# Patient Record
Sex: FEMALE | Employment: OTHER | URBAN - METROPOLITAN AREA
[De-identification: names, ages, dates, MRNs, and addresses within clinical notes are randomized per-mention and may not be internally consistent; named-entity substitution may affect disease eponyms.]

---

## 2018-06-26 DIAGNOSIS — R09.89 ABNORMAL CHEST SOUNDS: Primary | ICD-10-CM

## 2018-06-26 DIAGNOSIS — G31.84 MILD COGNITIVE IMPAIRMENT WITH MEMORY LOSS: Primary | ICD-10-CM

## 2018-06-29 ENCOUNTER — HOSPITAL ENCOUNTER (OUTPATIENT)
Dept: RADIOLOGY | Facility: HOSPITAL | Age: 83
Discharge: HOME | End: 2018-06-29
Attending: FAMILY MEDICINE
Payer: MEDICARE

## 2018-06-29 ENCOUNTER — HOSPITAL ENCOUNTER (INPATIENT)
Facility: HOSPITAL | Age: 83
LOS: 2 days | Discharge: HOME HEALTH | DRG: 055 | End: 2018-07-01
Attending: EMERGENCY MEDICINE | Admitting: HOSPITALIST
Payer: MEDICARE

## 2018-06-29 DIAGNOSIS — I62.9 INTRACRANIAL HEMORRHAGE: ICD-10-CM

## 2018-06-29 DIAGNOSIS — G31.84 MILD COGNITIVE IMPAIRMENT WITH MEMORY LOSS: ICD-10-CM

## 2018-06-29 DIAGNOSIS — R09.89 ABNORMAL CHEST SOUNDS: ICD-10-CM

## 2018-06-29 DIAGNOSIS — I10 ESSENTIAL HYPERTENSION: ICD-10-CM

## 2018-06-29 DIAGNOSIS — C79.31 BRAIN METASTASIS: ICD-10-CM

## 2018-06-29 DIAGNOSIS — G93.9 BRAIN LESION: Primary | ICD-10-CM

## 2018-06-29 DIAGNOSIS — H53.461 HEMIANOPIA, HOMONYMOUS, RIGHT: ICD-10-CM

## 2018-06-29 PROBLEM — R47.01 APHASIA: Status: ACTIVE | Noted: 2018-06-29

## 2018-06-29 LAB
AFP SERPL-MCNC: 4.9 NG/ML
ALBUMIN SERPL BCP-MCNC: 3.6 G/DL
ALP SERPL-CCNC: 96 U/L
ALT SERPL W/O P-5'-P-CCNC: 11 U/L
AMORPH CRY UR QL COMP ASSIST: ABNORMAL
ANION GAP SERPL CALC-SCNC: 8 MMOL/L
AST SERPL-CCNC: 15 U/L
BACTERIA #/AREA URNS AUTO: ABNORMAL /HPF
BASOPHILS # BLD AUTO: 0.03 K/UL
BASOPHILS NFR BLD: 0.3 %
BILIRUB SERPL-MCNC: 0.5 MG/DL
BILIRUB UR QL STRIP: NEGATIVE
BUN SERPL-MCNC: 13 MG/DL
CALCIUM SERPL-MCNC: 10.5 MG/DL
CANCER AG19-9 SERPL-ACNC: 5 U/ML
CEA SERPL-MCNC: 4.4 NG/ML
CHLORIDE SERPL-SCNC: 107 MMOL/L
CLARITY UR REFRACT.AUTO: ABNORMAL
CO2 SERPL-SCNC: 28 MMOL/L
COLOR UR AUTO: YELLOW
CREAT SERPL-MCNC: 0.7 MG/DL
DIFFERENTIAL METHOD: NORMAL
EOSINOPHIL # BLD AUTO: 0.1 K/UL
EOSINOPHIL NFR BLD: 1.5 %
ERYTHROCYTE [DISTWIDTH] IN BLOOD BY AUTOMATED COUNT: 14.1 %
EST. GFR  (AFRICAN AMERICAN): >60 ML/MIN/1.73 M^2
EST. GFR  (NON AFRICAN AMERICAN): >60 ML/MIN/1.73 M^2
GLUCOSE SERPL-MCNC: 165 MG/DL
GLUCOSE UR QL STRIP: NEGATIVE
HCT VFR BLD AUTO: 42.9 %
HGB BLD-MCNC: 14.2 G/DL
HGB UR QL STRIP: NEGATIVE
IMM GRANULOCYTES # BLD AUTO: 0.03 K/UL
IMM GRANULOCYTES NFR BLD AUTO: 0.3 %
INR PPP: 0.9
KETONES UR QL STRIP: NEGATIVE
LEUKOCYTE ESTERASE UR QL STRIP: ABNORMAL
LYMPHOCYTES # BLD AUTO: 2.3 K/UL
LYMPHOCYTES NFR BLD: 26.1 %
MAGNESIUM SERPL-MCNC: 2 MG/DL
MCH RBC QN AUTO: 29.9 PG
MCHC RBC AUTO-ENTMCNC: 33.1 G/DL
MCV RBC AUTO: 90 FL
MICROSCOPIC COMMENT: ABNORMAL
MONOCYTES # BLD AUTO: 0.6 K/UL
MONOCYTES NFR BLD: 6.8 %
NEUTROPHILS # BLD AUTO: 5.6 K/UL
NEUTROPHILS NFR BLD: 65 %
NITRITE UR QL STRIP: POSITIVE
NRBC BLD-RTO: 0 /100 WBC
PH UR STRIP: 7 [PH] (ref 5–8)
PHOSPHATE SERPL-MCNC: 2.9 MG/DL
PLATELET # BLD AUTO: 220 K/UL
PMV BLD AUTO: 11.2 FL
POTASSIUM SERPL-SCNC: 3.7 MMOL/L
PROT SERPL-MCNC: 7.1 G/DL
PROT UR QL STRIP: NEGATIVE
PROTHROMBIN TIME: 10.1 SEC
RBC # BLD AUTO: 4.75 M/UL
SODIUM SERPL-SCNC: 143 MMOL/L
SP GR UR STRIP: 1.02 (ref 1–1.03)
URN SPEC COLLECT METH UR: ABNORMAL
UROBILINOGEN UR STRIP-ACNC: NEGATIVE EU/DL
WBC # BLD AUTO: 8.62 K/UL
WBC #/AREA URNS AUTO: 6 /HPF (ref 0–5)
WBC CLUMPS UR QL AUTO: ABNORMAL

## 2018-06-29 PROCEDURE — 25000003 PHARM REV CODE 250: Performed by: HOSPITALIST

## 2018-06-29 PROCEDURE — 99285 EMERGENCY DEPT VISIT HI MDM: CPT | Mod: ,,, | Performed by: EMERGENCY MEDICINE

## 2018-06-29 PROCEDURE — 93880 EXTRACRANIAL BILAT STUDY: CPT | Mod: TC

## 2018-06-29 PROCEDURE — 70450 CT HEAD/BRAIN W/O DYE: CPT | Mod: 26,,, | Performed by: RADIOLOGY

## 2018-06-29 PROCEDURE — 70450 CT HEAD/BRAIN W/O DYE: CPT | Mod: TC

## 2018-06-29 PROCEDURE — 93880 EXTRACRANIAL BILAT STUDY: CPT | Mod: 26,,, | Performed by: RADIOLOGY

## 2018-06-29 PROCEDURE — 85610 PROTHROMBIN TIME: CPT

## 2018-06-29 PROCEDURE — 99223 1ST HOSP IP/OBS HIGH 75: CPT | Mod: ,,, | Performed by: HOSPITALIST

## 2018-06-29 PROCEDURE — 25500020 PHARM REV CODE 255: Performed by: HOSPITALIST

## 2018-06-29 PROCEDURE — 11000001 HC ACUTE MED/SURG PRIVATE ROOM

## 2018-06-29 PROCEDURE — 85025 COMPLETE CBC W/AUTO DIFF WBC: CPT

## 2018-06-29 PROCEDURE — 25500020 PHARM REV CODE 255: Performed by: EMERGENCY MEDICINE

## 2018-06-29 PROCEDURE — 86301 IMMUNOASSAY TUMOR CA 19-9: CPT

## 2018-06-29 PROCEDURE — 83735 ASSAY OF MAGNESIUM: CPT

## 2018-06-29 PROCEDURE — A9585 GADOBUTROL INJECTION: HCPCS | Performed by: EMERGENCY MEDICINE

## 2018-06-29 PROCEDURE — 99285 EMERGENCY DEPT VISIT HI MDM: CPT

## 2018-06-29 PROCEDURE — 80053 COMPREHEN METABOLIC PANEL: CPT

## 2018-06-29 PROCEDURE — 81001 URINALYSIS AUTO W/SCOPE: CPT

## 2018-06-29 PROCEDURE — 82105 ALPHA-FETOPROTEIN SERUM: CPT

## 2018-06-29 PROCEDURE — 99223 1ST HOSP IP/OBS HIGH 75: CPT | Mod: ,,, | Performed by: NURSE PRACTITIONER

## 2018-06-29 PROCEDURE — 84100 ASSAY OF PHOSPHORUS: CPT

## 2018-06-29 PROCEDURE — 93010 ELECTROCARDIOGRAM REPORT: CPT | Mod: ,,, | Performed by: INTERNAL MEDICINE

## 2018-06-29 PROCEDURE — 82378 CARCINOEMBRYONIC ANTIGEN: CPT

## 2018-06-29 RX ORDER — LISINOPRIL 20 MG/1
20 TABLET ORAL DAILY
Status: DISCONTINUED | OUTPATIENT
Start: 2018-06-30 | End: 2018-07-01 | Stop reason: HOSPADM

## 2018-06-29 RX ORDER — AMLODIPINE BESYLATE 5 MG/1
5 TABLET ORAL DAILY
Status: DISCONTINUED | OUTPATIENT
Start: 2018-06-30 | End: 2018-07-01 | Stop reason: HOSPADM

## 2018-06-29 RX ORDER — ALPRAZOLAM 0.25 MG/1
0.25 TABLET ORAL 3 TIMES DAILY
COMMUNITY

## 2018-06-29 RX ORDER — GADOBUTROL 604.72 MG/ML
7 INJECTION INTRAVENOUS
Status: COMPLETED | OUTPATIENT
Start: 2018-06-29 | End: 2018-06-29

## 2018-06-29 RX ORDER — ALPRAZOLAM 0.25 MG/1
0.25 TABLET ORAL 3 TIMES DAILY
Status: DISCONTINUED | OUTPATIENT
Start: 2018-06-30 | End: 2018-06-29

## 2018-06-29 RX ORDER — ZOLPIDEM TARTRATE 10 MG/1
5 TABLET ORAL NIGHTLY PRN
COMMUNITY

## 2018-06-29 RX ORDER — FLUTICASONE PROPIONATE 50 MCG
2 SPRAY, SUSPENSION (ML) NASAL DAILY
Status: DISCONTINUED | OUTPATIENT
Start: 2018-06-29 | End: 2018-07-01 | Stop reason: HOSPADM

## 2018-06-29 RX ORDER — AMLODIPINE BESYLATE 5 MG/1
5 TABLET ORAL DAILY
COMMUNITY

## 2018-06-29 RX ORDER — CETIRIZINE HYDROCHLORIDE 5 MG/1
5 TABLET ORAL DAILY
Status: DISCONTINUED | OUTPATIENT
Start: 2018-06-29 | End: 2018-07-01 | Stop reason: HOSPADM

## 2018-06-29 RX ORDER — ATORVASTATIN CALCIUM 40 MG/1
40 TABLET, FILM COATED ORAL DAILY
COMMUNITY

## 2018-06-29 RX ORDER — LISINOPRIL 20 MG/1
20 TABLET ORAL DAILY
COMMUNITY

## 2018-06-29 RX ORDER — ALPRAZOLAM 0.25 MG/1
0.25 TABLET ORAL 3 TIMES DAILY PRN
Status: DISCONTINUED | OUTPATIENT
Start: 2018-06-29 | End: 2018-07-01 | Stop reason: HOSPADM

## 2018-06-29 RX ADMIN — IOHEXOL 75 ML: 350 INJECTION, SOLUTION INTRAVENOUS at 11:06

## 2018-06-29 RX ADMIN — GADOBUTROL 7 ML: 604.72 INJECTION INTRAVENOUS at 05:06

## 2018-06-29 RX ADMIN — CETIRIZINE HYDROCHLORIDE 5 MG: 5 TABLET, FILM COATED ORAL at 09:06

## 2018-06-29 NOTE — ED TRIAGE NOTES
Pt had fell a month ago, has had slurred and scattered speech since.  Went for a CT today,  MD sent them here concerned about a stroke. No diagnosis of alzheimers or dementia.  Patient stating her age incorrectly, not able to name president, is able to state the month.  No neurological deficits noted. Pupils round and reactive.
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
## 2018-06-29 NOTE — ED PROVIDER NOTES
"Encounter Date: 6/29/2018    SCRIBE #1 NOTE: I, Young Salazar, am scribing for, and in the presence of,  Dr. Sparks. I have scribed the entire note.       History     Chief Complaint   Patient presents with    Abnormal Ct Scan     Pt called from home by MD to come to ED for eval of abnormal CT scan.  Pt fell 1 month ago with increased symptoms over the last 2 weeks of dysphasia and slow ambulation     81 y/o female with co-morbidity of HTN presents to the ED after CT of her head came abnormal. She had a fall a month ago. Per son, she has worsening "difficulty finding words"  and slower ambulation over the last two weeks. Pt denies headaches or difficulty swallowing. No other complaints.      The history is provided by the patient, medical records and a relative.     Review of patient's allergies indicates:  No Known Allergies  Past Medical History:   Diagnosis Date    Hypertension      History reviewed. No pertinent surgical history.  History reviewed. No pertinent family history.  Social History   Substance Use Topics    Smoking status: Current Every Day Smoker     Packs/day: 0.50     Types: Cigarettes    Smokeless tobacco: Never Used    Alcohol use No     Review of Systems   Constitutional: Negative for fever.   HENT: Negative for sore throat and trouble swallowing.    Respiratory: Negative for shortness of breath.    Cardiovascular: Negative for chest pain.   Gastrointestinal: Negative for nausea.   Genitourinary: Negative for dysuria.   Musculoskeletal: Negative for back pain.   Skin: Negative for rash.   Neurological: Negative for weakness and headaches.   Hematological: Does not bruise/bleed easily.       Physical Exam     Initial Vitals [06/29/18 1520]   BP Pulse Resp Temp SpO2   (!) 195/86 68 20 98.4 °F (36.9 °C) 98 %      MAP       --         Physical Exam    Vitals reviewed.  Constitutional: She appears well-developed and well-nourished.   HENT:   Head: Normocephalic and atraumatic.   Mouth/Throat: " Oropharynx is clear and moist.   Eyes: EOM are normal. Pupils are equal, round, and reactive to light.   Neck: No tracheal deviation present.   Cardiovascular: Normal rate and intact distal pulses.   Pulmonary/Chest: Breath sounds normal. No stridor. No respiratory distress.   Abdominal: Soft. She exhibits no distension. There is no tenderness.   Musculoskeletal: Normal range of motion. She exhibits no edema.   Neurological: She is alert and oriented to person, place, and time.   Skin: Skin is warm.   Psychiatric: Her behavior is normal. Thought content normal.         ED Course   Procedures  Labs Reviewed   COMPREHENSIVE METABOLIC PANEL - Abnormal; Notable for the following:        Result Value    Glucose 165 (*)     All other components within normal limits   URINALYSIS, REFLEX TO URINE CULTURE - Abnormal; Notable for the following:     Appearance, UA Cloudy (*)     Nitrite, UA Positive (*)     Leukocytes, UA 2+ (*)     All other components within normal limits    Narrative:     Preferred Collection Type->Urine, Clean Catch   URINALYSIS MICROSCOPIC - Abnormal; Notable for the following:     WBC, UA 6 (*)     Bacteria, UA Moderate (*)     All other components within normal limits    Narrative:     Preferred Collection Type->Urine, Clean Catch   CBC W/ AUTO DIFFERENTIAL   PROTIME-INR   MAGNESIUM   PHOSPHORUS   CEA   AFP TUMOR MARKER   CANCER ANTIGEN 19-9          Imaging Results          CT Chest Abdoment Pelvis With Contrast (Final result)  Result time 06/30/18 00:23:51   Procedure changed from CT Chest Abdomen Pelvis W W/O Contrast (XPD)     Final result by French Winchester MD (06/30/18 00:23:51)                 Impression:      1.4 cm exophytic rounded right lower pole renal lesion concerning for an underlying renal mass.  Further evaluation with MRI or CT renal mass protocol recommended.    Bilateral indeterminate adrenal gland nodules concerning for adrenal metastases versus adenomas.  Further evaluation with  MRI or CT adrenal mass protocol.    Ectatic descending thoracic aorta.    Cholelithiasis.    Colonic diverticulosis.    Additional findings as detailed above.    Electronically signed by resident: Gómez Guy  Date:    06/29/2018  Time:    23:37    Electronically signed by: French Winchester MD  Date:    06/30/2018  Time:    00:23             Narrative:    EXAMINATION:  CT CHEST ABDOMEN PELVIS WITH CONTRAST (XPD)    CLINICAL HISTORY:  Brain mets - looking for primary;    TECHNIQUE:  Axial CT images of the chest abdomen and pelvis obtained after the administration of intravenous contrast 75 cc of Omnipaque 350.  Coronal and sagittal reformats are provided.  Delayed phase imaging was also obtained.    COMPARISON:  None    FINDINGS:  Examination of the vascular and soft tissue structures at the base of the neck is normal.    A left sided aortic arch with 3 branch vessels is identified.  The thoracic aorta is normal in contour and course with moderate atherosclerotic calcification within its course.  The descending thoracic aorta is ectatic measuring up to 2.8 cm at the diaphragmatic hiatus.  The heart is not enlarged. No pericardial effusion is seen.  There is coronary artery atherosclerosis.    The trachea is midline, the proximal airways are patent, and the lungs are symmetrically expanded.  Examination of the lung fields demonstrates no evidence of consolidation, mass, or significant pleural thickening, nor is there evidence of pleural fluid.    No axillary or mediastinal lymph node enlargement is seen.    The esophagus maintains a normal course and caliber.    The liver is normal in size and attenuation.  No focal hepatic abnormality is seen.  The gallbladder demonstrate a 1.3 cm gallstone.  No surrounding inflammatory changes.  No intrahepatic or extrahepatic biliary ductal dilatation is noted.    The stomach, spleen, and pancreas are normal.  There is a 2.1 x 1.6 left adrenal gland nodule.  There is a 1.8 x 1.7  cm right adrenal gland nodule.    The kidneys are normal in size and location.  There is a 1.3 x 1.4 cm enhancing exophytic lesion along the lower pole of the right kidney.  No hydronephrosis is seen.  The ureters appear normal in course and caliber without evidence of ureteral dilatation. The urinary bladder is normal. The uterus is surgically removed.    The visualized loops of small and large bowel show no evidence of obstruction or inflammation.  There is colonic diverticulosis without evidence of diverticulitis.  Appendix is normal.    No ascites, free fluid, or intraperitoneal free air is identified.    There is no evidence of lymph node enlargement in the abdomen or pelvis.    The abdominal aorta is tortuous with significant atherosclerotic calcifications throughout its course and branching vessels.    The osseus structures demonstrate age-appropriate degenerative change without evidence of acute fracture or osseus destructive process.    The extraperitoneal soft tissues are normal.                               MRI Brain W WO Contrast (Final result)  Result time 06/29/18 18:58:28    Final result by Adeel Guaman MD (06/29/18 18:58:28)                 Impression:      Multiple rim enhancing masses within the supratentorial and tentorial brain with the largest lesion in the left occipital lobe measuring 2.2 x 2.3 x 2.3 cm.  The findings are most suggestive of metastatic disease to the brain.    Susceptibility weighted artifact involving the lesion in the right CP angle may represent prior blood products.  No definite evidence of acute parenchymal hemorrhage.    Scattered areas of diffusion the bilateral CP angles are felt to represent cellularity of the associated lesions without definitive evidence of infarction.    Electronically signed by resident: Jim Valle  Date:    06/29/2018  Time:    18:01    Electronically signed by: Adeel Guaman MD  Date:    06/29/2018  Time:    18:58             Narrative:     EXAMINATION:  MRI BRAIN W WO CONTRAST    CLINICAL HISTORY:  intracranial hemorrhage;.    TECHNIQUE:  Multiplanar and multisequence MR imaging of the brain was performed before and after the administration of 7 mL Gadavist  intravenous contrast.    COMPARISON:  Head CT without contrast 06/29/2018    FINDINGS:  Intracranial compartment:    The ventricles and sulci are normal in size for age without evidence of hydrocephalus. There is SWI signal dropout in the right CP angle cistern.    There are multiple enhancing masses within the brain.  The largest is in the left occipital lobe and measures 2.3 x 2.3 x 2.3 cm with peripheral edema on T2/FLAIR imaging.  This lesion appears intrinsically T2 bright and T1 hypo intense.  There are multiple smaller lesions exhibiting enhancement in the left temporal lobe left frontal lobe and bilateral cerebellopontine angles.  The smaller lesions are less clearly rim enhancing aside from the right cerebellopontine angle lesion.  There is also a 1 cm enhancing lesion along the inferior interhemispheric fissure.  No evidence of associated edema is identified.  There is FLAIR hyperintensity about the left frontal and CP angle lesions suggesting edema.  There are scattered areas of diffusion restriction the bilateral CP angles.    Additional T2/FLAIR hyperintensities consistent with mild chronic microvascular ischemic change.  No midline shift or significant global mass effect from these masses.    The intracranial flow voids are within normal limits.    Skull/extracranial contents (limited evaluation): Bone marrow signal intensity is normal.                                 Medical Decision Making:   Differential Diagnosis:   Intracranial hemorrhage, brain mass, electrolyte derangement, lethal arrhythmia            Scribe Attestation:   Scribe #1: I performed the above scribed service and the documentation accurately describes the services I performed. I attest to the accuracy of the  note.    Attending Attestation:             Attending ED Notes:   Patient has been urgently evaluated by vascular neurology who have reviewed advanced imaging of the brain and do not feel that the patient's neurologic presenting complaints are due to an acute/subacute stroke, rather related to a metastatic brain lesion identified on MRI.  The patient will be admitted to Internal Medicine in fair condition for further management and evaluation.             Clinical Impression:   The primary encounter diagnosis was Brain lesion. Diagnoses of Intracranial hemorrhage, Brain metastasis, Hemianopia, homonymous, right, and Essential hypertension were also pertinent to this visit.      Disposition:   Disposition: Admitted  Condition: Fair  IM                        Frandy Sparks MD  07/02/18 0149

## 2018-06-30 PROBLEM — E27.8 ADRENAL NODULE: Status: ACTIVE | Noted: 2018-06-30

## 2018-06-30 PROBLEM — F51.01 PRIMARY INSOMNIA: Chronic | Status: ACTIVE | Noted: 2018-06-30

## 2018-06-30 PROBLEM — N28.89 RENAL MASS, RIGHT: Status: ACTIVE | Noted: 2018-06-30

## 2018-06-30 PROBLEM — N30.00 ACUTE CYSTITIS WITHOUT HEMATURIA: Status: ACTIVE | Noted: 2018-06-30

## 2018-06-30 PROCEDURE — 25000003 PHARM REV CODE 250: Performed by: PHYSICIAN ASSISTANT

## 2018-06-30 PROCEDURE — 99223 1ST HOSP IP/OBS HIGH 75: CPT | Mod: ,,, | Performed by: NEUROLOGICAL SURGERY

## 2018-06-30 PROCEDURE — 25000003 PHARM REV CODE 250: Performed by: HOSPITALIST

## 2018-06-30 PROCEDURE — A9585 GADOBUTROL INJECTION: HCPCS | Performed by: HOSPITALIST

## 2018-06-30 PROCEDURE — 97165 OT EVAL LOW COMPLEX 30 MIN: CPT

## 2018-06-30 PROCEDURE — 11000001 HC ACUTE MED/SURG PRIVATE ROOM

## 2018-06-30 PROCEDURE — 25000003 PHARM REV CODE 250: Performed by: NURSE PRACTITIONER

## 2018-06-30 PROCEDURE — 63600175 PHARM REV CODE 636 W HCPCS: Performed by: HOSPITALIST

## 2018-06-30 PROCEDURE — 97162 PT EVAL MOD COMPLEX 30 MIN: CPT

## 2018-06-30 PROCEDURE — 99232 SBSQ HOSP IP/OBS MODERATE 35: CPT | Mod: ,,, | Performed by: HOSPITALIST

## 2018-06-30 PROCEDURE — 25500020 PHARM REV CODE 255: Performed by: HOSPITALIST

## 2018-06-30 PROCEDURE — 97161 PT EVAL LOW COMPLEX 20 MIN: CPT

## 2018-06-30 PROCEDURE — 97530 THERAPEUTIC ACTIVITIES: CPT

## 2018-06-30 PROCEDURE — 92610 EVALUATE SWALLOWING FUNCTION: CPT

## 2018-06-30 RX ORDER — CEFTRIAXONE 1 G/1
1 INJECTION, POWDER, FOR SOLUTION INTRAMUSCULAR; INTRAVENOUS
Status: DISCONTINUED | OUTPATIENT
Start: 2018-06-30 | End: 2018-07-01 | Stop reason: HOSPADM

## 2018-06-30 RX ORDER — PROMETHAZINE HYDROCHLORIDE 25 MG/1
25 TABLET ORAL EVERY 6 HOURS PRN
Status: DISCONTINUED | OUTPATIENT
Start: 2018-06-30 | End: 2018-07-01 | Stop reason: HOSPADM

## 2018-06-30 RX ORDER — ZOLPIDEM TARTRATE 5 MG/1
5 TABLET ORAL NIGHTLY PRN
Status: DISCONTINUED | OUTPATIENT
Start: 2018-06-30 | End: 2018-06-30

## 2018-06-30 RX ORDER — POLYETHYLENE GLYCOL 3350 17 G/17G
17 POWDER, FOR SOLUTION ORAL 2 TIMES DAILY PRN
Status: DISCONTINUED | OUTPATIENT
Start: 2018-06-30 | End: 2018-07-01 | Stop reason: HOSPADM

## 2018-06-30 RX ORDER — SODIUM CHLORIDE 0.9 % (FLUSH) 0.9 %
5 SYRINGE (ML) INJECTION
Status: DISCONTINUED | OUTPATIENT
Start: 2018-06-30 | End: 2018-07-01 | Stop reason: HOSPADM

## 2018-06-30 RX ORDER — AMOXICILLIN 250 MG
1 CAPSULE ORAL DAILY
Status: DISCONTINUED | OUTPATIENT
Start: 2018-06-30 | End: 2018-07-01 | Stop reason: HOSPADM

## 2018-06-30 RX ORDER — GADOBUTROL 604.72 MG/ML
10 INJECTION INTRAVENOUS
Status: COMPLETED | OUTPATIENT
Start: 2018-06-30 | End: 2018-06-30

## 2018-06-30 RX ORDER — RAMELTEON 8 MG/1
8 TABLET ORAL NIGHTLY PRN
Status: DISCONTINUED | OUTPATIENT
Start: 2018-06-30 | End: 2018-06-30

## 2018-06-30 RX ORDER — ACETAMINOPHEN 325 MG/1
650 TABLET ORAL EVERY 8 HOURS PRN
Status: DISCONTINUED | OUTPATIENT
Start: 2018-06-30 | End: 2018-07-01 | Stop reason: HOSPADM

## 2018-06-30 RX ORDER — ZOLPIDEM TARTRATE 5 MG/1
5 TABLET ORAL NIGHTLY
Status: DISCONTINUED | OUTPATIENT
Start: 2018-06-30 | End: 2018-07-01 | Stop reason: HOSPADM

## 2018-06-30 RX ORDER — DEXAMETHASONE 6 MG/1
6 TABLET ORAL 2 TIMES DAILY WITH MEALS
Qty: 20 TABLET | Refills: 0
Start: 2018-06-30 | End: 2018-07-01

## 2018-06-30 RX ORDER — LEVETIRACETAM 500 MG/1
500 TABLET ORAL 2 TIMES DAILY
Status: DISCONTINUED | OUTPATIENT
Start: 2018-06-30 | End: 2018-07-01 | Stop reason: HOSPADM

## 2018-06-30 RX ORDER — ONDANSETRON 8 MG/1
8 TABLET, ORALLY DISINTEGRATING ORAL EVERY 8 HOURS PRN
Status: DISCONTINUED | OUTPATIENT
Start: 2018-06-30 | End: 2018-07-01 | Stop reason: HOSPADM

## 2018-06-30 RX ORDER — LEVETIRACETAM 500 MG/1
500 TABLET ORAL 2 TIMES DAILY
Qty: 60 TABLET | Refills: 11
Start: 2018-06-30 | End: 2018-07-01

## 2018-06-30 RX ORDER — LEVOFLOXACIN 750 MG/1
750 TABLET ORAL DAILY
Qty: 4 TABLET | Refills: 0
Start: 2018-06-30 | End: 2018-07-01

## 2018-06-30 RX ORDER — DEXAMETHASONE 4 MG/1
4 TABLET ORAL EVERY 6 HOURS
Status: DISCONTINUED | OUTPATIENT
Start: 2018-06-30 | End: 2018-07-01 | Stop reason: HOSPADM

## 2018-06-30 RX ADMIN — AMLODIPINE BESYLATE 5 MG: 5 TABLET ORAL at 08:06

## 2018-06-30 RX ADMIN — DEXAMETHASONE 4 MG: 4 TABLET ORAL at 06:06

## 2018-06-30 RX ADMIN — CEFTRIAXONE SODIUM 1 G: 1 INJECTION, POWDER, FOR SOLUTION INTRAMUSCULAR; INTRAVENOUS at 03:06

## 2018-06-30 RX ADMIN — LEVETIRACETAM 500 MG: 500 TABLET, FILM COATED ORAL at 08:06

## 2018-06-30 RX ADMIN — CETIRIZINE HYDROCHLORIDE 5 MG: 5 TABLET, FILM COATED ORAL at 08:06

## 2018-06-30 RX ADMIN — ZOLPIDEM TARTRATE 5 MG: 5 TABLET ORAL at 09:06

## 2018-06-30 RX ADMIN — GADOBUTROL 10 ML: 604.72 INJECTION INTRAVENOUS at 05:06

## 2018-06-30 RX ADMIN — RAMELTEON 8 MG: 8 TABLET, FILM COATED ORAL at 12:06

## 2018-06-30 RX ADMIN — DEXAMETHASONE 4 MG: 4 TABLET ORAL at 11:06

## 2018-06-30 RX ADMIN — LISINOPRIL 20 MG: 20 TABLET ORAL at 08:06

## 2018-06-30 RX ADMIN — LEVETIRACETAM 500 MG: 500 TABLET, FILM COATED ORAL at 11:06

## 2018-06-30 NOTE — ASSESSMENT & PLAN NOTE
Chronic issue, Goal BP <160  - Might benefit from better BP control outpatient  - Amlodipine and lisinopril

## 2018-06-30 NOTE — SUBJECTIVE & OBJECTIVE
Interval History:  Doing well, no complains. Updated pt and family on findings so far.     Review of Systems   Constitutional: Negative for chills, fatigue, fever.   HENT: Negative for sore throat, trouble swallowing.    Eyes: Negative for photophobia, visual disturbance.   Respiratory: Negative for cough, shortness of breath.    Cardiovascular: Negative for chest pain, palpitations, leg swelling.   Gastrointestinal: Negative for abdominal pain, constipation, diarrhea, nausea, vomiting.   Endocrine: Negative for cold intolerance, heat intolerance.   Genitourinary: Negative for dysuria, frequency.   Musculoskeletal: Negative for arthralgias, myalgias.   Skin: Negative for rash, wound, erythema   Neurological: + weakness   Psychiatric/Behavioral: + speech difficulty  All other systems reviewed and are negative.     Objective:     Vital Signs (Most Recent):  Temp: 97.9 °F (36.6 °C) (06/30/18 0621)  Pulse: 65 (06/30/18 0731)  Resp: 15 (06/30/18 0731)  BP: (!) 154/70 (06/30/18 0731)  SpO2: (!) 94 % (06/30/18 0731) Vital Signs (24h Range):  Temp:  [97.9 °F (36.6 °C)-98.4 °F (36.9 °C)] 97.9 °F (36.6 °C)  Pulse:  [] 65  Resp:  [15-41] 15  SpO2:  [91 %-98 %] 94 %  BP: (147-195)/(70-90) 154/70     Weight: 60.7 kg (133 lb 13.1 oz)  Body mass index is 21.6 kg/m².  No intake or output data in the 24 hours ending 06/30/18 1350     Physical Exam  Constitutional: Appears well-developed and well-nourished.   Head: Normocephalic and atraumatic.   Mouth/Throat: Oropharynx is clear and moist.   Eyes: EOM are normal. Pupils are equal, round, and reactive to light. No scleral icterus.   Neck: Normal range of motion. Neck supple.   Cardiovascular: Normal heart rate.  Regular heart rhythm.  No murmur heard.  Pulmonary/Chest: Effort normal and breath sounds normal. No respiratory distress. No wheezes, rales, or rhonchi  Abdominal: Soft. Bowel sounds are normal.  No distension.  No tenderness  Musculoskeletal: Normal range of motion.  No edema.   Neurological: Alert and oriented to person, place, and time.   Skin: Skin is warm and dry.   Psychiatric: Normal mood and affect. Behavior is normal.     MELD-Na score: 6 at 6/29/2018  3:59 PM  MELD score: 6 at 6/29/2018  3:59 PM  Calculated from:  Serum Creatinine: 0.7 mg/dL (Rounded to 1) at 6/29/2018  3:59 PM  Serum Sodium: 143 mmol/L (Rounded to 137) at 6/29/2018  3:59 PM  Total Bilirubin: 0.5 mg/dL (Rounded to 1) at 6/29/2018  3:59 PM  INR(ratio): 0.9 (Rounded to 1) at 6/29/2018  3:59 PM  Age: 82 years    Significant Labs:  CBC:    Recent Labs  Lab 06/29/18  1559   WBC 8.62   HGB 14.2   HCT 42.9        CMP:    Recent Labs  Lab 06/29/18  1559      K 3.7      CO2 28   *   BUN 13   CREATININE 0.7   CALCIUM 10.5   PROT 7.1   ALBUMIN 3.6   BILITOT 0.5   ALKPHOS 96   AST 15   ALT 11   ANIONGAP 8   EGFRNONAA >60.0     PTINR:    Recent Labs  Lab 06/29/18  1559   INR 0.9       Significant Procedures:   Dobutamine Stress Test with Color Flow: No results found for this or any previous visit.    None

## 2018-06-30 NOTE — ASSESSMENT & PLAN NOTE
Right Homonymous Hemianopsia  - CT head remarkable for an acute left occipital and left parieto-occipital infarct with hemorrhagic transformation  - Evaluated by Vascular Neurology - suggest PT/OT/SLP eval  - pt was to go home, will set up home health

## 2018-06-30 NOTE — ASSESSMENT & PLAN NOTE
- MRI Brain (6/29/18) showed multiple rim enhancing masses with the largeest measuring 2.2 x 2.3 x 2.3cm  - CT C/A/P suspicious for renal and adrenal lesion but subsequent MRI  revealed benign etiology (adrenal lesions suggestive of adrenal adenoma and renal lesion consistent with hemorrhagic cyst)  - CEA, AFP, CA 19-9: are negative  - NSGY consulted, PO steroid started, PO Keppra started  - Patient says she doesn't want invasive measures at this time - patient does not want to pursue brain biopsy  - Oncology consulted, appreciate eval and discussion with family  - Adrenal masses too small for biopsy, pt does not want brain biopsy given its invasive nature  - Oncology recommend radiation oncology evaluation for consideration of XRT, follow up with hematology/oncology clinic (would like to be seen in [facility]) and PET scan to look for other areas to biopsy   - PT/OT consulted: will need home health, passed SLP  - D/C home per pt's wish. Family is comfortable with this plan

## 2018-06-30 NOTE — ASSESSMENT & PLAN NOTE
- given new neuro complains, UA was checked  - + nitrite   - start IV rocephin, plan to convert to PO soon
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
## 2018-06-30 NOTE — PLAN OF CARE
[facility]    HOME HEALTH ORDERS  FACE TO FACE ENCOUNTER    Patient Name: Alfreda Botello  YOB: 1935    PCP: Louis Estrada Iii, MD   [address]  PCP Phone Number: [phone]  PCP Fax: [phone]    Encounter Date: 07/01/2018    Admit to Home Health    Diagnoses:  Active Hospital Problems    Diagnosis  POA    *Brain metastasis [C79.31]  Yes    Acute cystitis without hematuria [N30.00]  Yes    Primary insomnia [F51.01]  Yes     Chronic    Renal mass, right [N28.89]  Yes    Adrenal nodule [E27.9]  Yes    Hemianopia, homonymous, right [H53.461]  Yes    Aphasia [R47.01]  Yes    Essential hypertension [I10]  Yes    Brain lesion [G93.9]  Yes      Resolved Hospital Problems    Diagnosis Date Resolved POA   No resolved problems to display.       No future appointments.  Follow-up Information     Louis Estrada Iii, MD. Schedule an appointment as soon as possible for a visit in 1 month.    Specialty:  Family Medicine  Why:  Office visit, Post Hospital Follow Up on  Contact information:  [address] [place] [address]  [phone]                     I have seen and examined this patient face to face today. My clinical findings that support the need for the home health skilled services and home bound status are the following:  Weakness/numbness causing balance and gait disturbance due to Infection and Malignancy/Cancer making it taxing to leave home.    Allergies:Review of patient's allergies indicates:  No Known Allergies    Diet: regular diet    Activities: activity as tolerated    Nursing:   SN to complete comprehensive assessment including routine vital signs. Instruct on disease process and s/s of complications to report to MD. Review/verify medication list sent home with the patient at time of discharge  and instruct patient/caregiver as needed. Frequency may be adjusted depending on start of care date.    Notify MD if SBP  > 160 or < 90; DBP > 90 or < 50; HR > 120 or < 50; Temp > 101       CONSULTS:    Physical Therapy to evaluate and treat. Evaluate for home safety and equipment needs; Establish/upgrade home exercise program. Perform / instruct on therapeutic exercises, gait training, transfer training, and Range of Motion.  Occupational Therapy to evaluate and treat. Evaluate home environment for safety and equipment needs. Perform/Instruct on transfers, ADL training, ROM, and therapeutic exercises.   to evaluate for community resources/long-range planning.    MISCELLANEOUS CARE:  N/A    WOUND CARE ORDERS  n/a      Medications: Review discharge medications with patient and family and provide education.      Current Discharge Medication List      START taking these medications    Details   dexamethasone (DECADRON) 4 MG Tab Take 1 tablet (4 mg total) by mouth 2 (two) times daily with meals. for 14 days  Qty: 28 tablet, Refills: 0      levETIRAcetam (KEPPRA) 500 MG Tab Take 1 tablet (500 mg total) by mouth 2 (two) times daily.  Qty: 60 tablet, Refills: 11      levoFLOXacin (LEVAQUIN) 750 MG tablet Take 1 tablet (750 mg total) by mouth once daily. Treat Urinary Tract Infection  Qty: 3 tablet, Refills: 0         CONTINUE these medications which have NOT CHANGED    Details   amLODIPine (NORVASC) 5 MG tablet Take 5 mg by mouth once daily.      atorvastatin (LIPITOR) 40 MG tablet Take 40 mg by mouth once daily.      lisinopril (PRINIVIL,ZESTRIL) 20 MG tablet Take 20 mg by mouth once daily.      zolpidem (AMBIEN) 10 mg Tab Take 5 mg by mouth nightly as needed.      ALPRAZolam (XANAX) 0.25 MG tablet Take 0.25 mg by mouth 3 (three) times daily.             I certify that this patient is confined to her home and needs physical therapy and occupational therapy.    Signing Physician     Neal Heard MD  Hospital Medicine Staff  Department of Hospital Medicine   [facility]  7/1/2018 2:17 PM
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
## 2018-06-30 NOTE — PT/OT/SLP EVAL
Physical Therapy Evaluation    Patient Name:  Alfreda Botello   MRN:  [id number]    Recommendations:     Discharge Recommendations:  home with home health   Discharge Equipment Recommendations: walker, rolling, shower chair   Barriers to discharge: None    Assessment:     Alfreda Botello is a 82 y.o. female admitted with a medical diagnosis of Brain metastasis.  She presents with the following impairments/functional limitations:  gait instability, impaired functional mobilty, impaired balance, decreased lower extremity function, impaired coordination limiting overall functional mobility. Safest to ambulate with assist of 1 using rolling walker at this time to decrease fall risk. Most limited by decreased safety awareness and impaired dynamic balance. To benefit from continued skilled PT intervention to address deficits. DC recs for HHPT to improve overall mobility and 24-hr supervision from family to decrease fall risk.    Rehab Prognosis:  Fair; patient would benefit from acute skilled PT services to address these deficits and reach maximum level of function.      Recent Surgery: * No surgery found *      Plan:     During this hospitalization, patient to be seen 3 x/week to address the above listed problems via gait training, therapeutic activities, therapeutic exercises, neuromuscular re-education  · Plan of Care Expires:  07/30/18   Plan of Care Reviewed with: patient, daughter    Subjective     Communicated with RN prior to session.  Patient found supine with family present upon PT entry to room, agreeable to evaluation.      Chief Complaint: none stated  Pain/Comfort:  · Pain Rating 1: 0/10  · Pain Rating Post-Intervention 1: 0/10    Patients cultural, spiritual, Zoroastrianism conflicts given the current situation: none stated    Living Environment:  Patient lives alone in 1-story home with threshold RK. Daughter lives next door. Walk-in shower.  Prior to admission, patients level of function was Indep as of  2-3 weeks ago.  Patient has the following equipment: none. Upon discharge, patient will have assistance from family as needed.    Objective:     Patient found with: telemetry     General Precautions: Standard, fall   Orthopedic Precautions:N/A   Braces: N/A     Exams:  · Cognitive Exam:  Patient is oriented to Person, Place and Situation and follows 100% of multi-step commands   · Fine Motor Coordination: -       Impaired  Rapid alternating ankle DF/PF  · Gross Motor Coordination:  Impaired BLE stepping L>R.  · Skin Integrity/Edema:  -       Skin integrity: Visible skin intact  · RLE ROM: WFL  · RLE Strength: WFL  · LLE ROM: WFL  · LLE Strength: WFL    Functional Mobility:  · Bed Mobility:     · Supine to Sit: modified independence  · Transfers:  Sit to Stand:  contact guard assistance with no AD  · Bed to Chair: contact guard assistance with  no AD  using  Stand Pivot  · Gait: 395htn6 CGA/HHA. Steppage gait noted with initial steps of ambulation of L. Decreased with increased duration. Excessive toe out bilaterally. Hard heel strike  · Balance: impaired dynamic balance requiring assist of 1 for safety.    AM-PAC 6 CLICK MOBILITY  Total Score:20       Therapeutic Activities and Exercises:   Patient educated on:   - role of PT/POC    - safety with all functional mobility   - bed mobility training   - transfer training   - gait training without device   - importance of participation with therapy services   - safes to ambulate with rolling walker and 1 person assist at this time.    Verbalized understanding of all education provided.      Patient left seated EOB with all lines intact, call button in reach, RN notified and family present.    GOALS:    Physical Therapy Goals        Problem: Physical Therapy Goal    Goal Priority Disciplines Outcome Goal Variances Interventions   Physical Therapy Goal     PT/OT, PT Ongoing (interventions implemented as appropriate)     Description:  Goals to be met by: 7/10/18     Patient  will increase functional independence with mobility by performin. Sit to stand transfer with Supervision  2. Bed to chair transfer with Supervision using Rolling Walker  3. Gait  x 140 feet with Supervision using Rolling Walker.                       History:     Past Medical History:   Diagnosis Date    Hypertension        History reviewed. No pertinent surgical history.    Clinical Decision Making:     History  Co-morbidities and personal factors that may impact the plan of care Examination  Body Structures and Functions, activity limitations and participation restrictions that may impact the plan of care Clinical Presentation   Decision Making/ Complexity Score   Co-morbidities:   [x] Time since onset of injury / illness / exacerbation  [x] Status of current condition  [x]Patient's cognitive status and safety concerns    [x] Multiple Medical Problems (see med hx)  Personal Factors:   [] Patient's age  [] Prior Level of function   [] Patient's home situation (environment and family support)  [] Patient's level of motivation  [] Expected progression of patient      HISTORY:(criteria)    [] 14695 - no personal factors/history    [] 33036 - has 1-2 personal factor/comorbidity     [x] 01497 - has >3 personal factor/comorbidity     Body Regions:  [] Objective examination findings  [] Head     []  Neck  [] Trunk   [] Upper Extremity  [] Lower Extremity    Body Systems:  [x] For communication ability, affect, cognition, language, and learning style: the assessment of the ability to make needs known, consciousness, orientation (person, place, and time), expected emotional /behavioral responses, and learning preferences (eg, learning barriers, education  needs)  [x] For the neuromuscular system: a general assessment of gross coordinated movement (eg, balance, gait, locomotion, transfers, and transitions) and motor function  (motor control and motor learning)  [] For the musculoskeletal system: the assessment of  gross symmetry, gross range of motion, gross strength, height, and weight  [] For the integumentary system: the assessment of pliability(texture), presence of scar formation, skin color, and skin integrity  [] For cardiovascular/pulmonary system: the assessment of heart rate, respiratory rate, blood pressure, and edema     Activity limitations:    [x] Patient's cognitive status and saf ety concerns          [] Status of current condition      [] Weight bearing restriction  [] Cardiopulmunary Restriction    Participation Restrictions:   [] Goals and goal agreement with the patient     [] Rehab potential (prognosis) and probable outcome      Examination of Body System: (criteria)    [] 56861 - addressing 1-2 elements    [x] 45740 - addressing a total of 3 or more elements     [] 75053 -  Addressing a total of 4 or more elements         Clinical Presentation: (criteria)  Evolving - 41761     On examination of body system using standardized tests and measures patient presents with 4 or more elements from any of the following: body structures and functions, activity limitations, and/or participation restrictions.  Leading to a clinical presentation that is considered evolving with changing characteristics                              Clinical Decision Making  (Eval Complexity):  Moderate - 32437     Time Tracking:     PT Received On: 06/30/18  PT Start Time: 1634     PT Stop Time: 1700  PT Total Time (min): 26 min     Billable Minutes: Evaluation 26      Deni Burgos III, PT  06/30/2018
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
## 2018-06-30 NOTE — ASSESSMENT & PLAN NOTE
81 y/o female with history of HTN now with imaging concerning for metastatic disease.  On exam she has a right hemianopia which would correspond to the lesion in the left PCA territory.  She also has some mild aphasia - difficulty finding words and loss of fluency with complex sentences.  Otherwise she has no other deficits.  Case discussed with Neurosurgery.  Given patient's stability, they recommended admission to Internal Medicine.    Recommendations:  1.  Consult Internal Medicine for admission  2.  Consult Neurosurgery for probably metastatic disease  3.  CT Chest, abdomen, pelvis to evaluate for primary lesion  4.  PT/OT/SLP evaluate and treat - right hemianopia, speech/cognition  5.  Stroke Service will sign off - please call for any questions

## 2018-06-30 NOTE — PLAN OF CARE
Problem: Occupational Therapy Goal  Goal: Occupational Therapy Goal  Outcome: Outcome(s) achieved Date Met: 06/30/18  Pt is near (I) re self care but OT recommends HHOT.    JOJO Tenorio  6/30/2018  Pager: [phone]

## 2018-06-30 NOTE — ASSESSMENT & PLAN NOTE
- MRI brain with multiple brain lesions concerning for metastatic disease. CEA, AFP, CA 19-9: are negative  - CT chest/abdomen/pelvis: 1.4 cm exophytic rounded right lower pole renal lesion concerning for an underlying renal mass.  Further evaluation with MRI or CT renal mass protocol recommended. Bilateral indeterminate adrenal gland nodules concerning for adrenal metastases versus adenomas.  Further evaluation with MRI or CT adrenal mass protocol.  - NSGY consulted, PO steroid started, PO Keppra started  - Patient says she doesn't want invasive measures at this time - but would like to get more information regarding what her diagnosis is and what her options are going forward  - Purse MRI abd at this time, pt appear to not want biopsy after long discussion with patient an family  - Will place outpatient Onc referral for further discussion  - PT/OT consulted, passed SLP

## 2018-06-30 NOTE — SUBJECTIVE & OBJECTIVE
Prescriptions Prior to Admission   Medication Sig Dispense Refill Last Dose    amLODIPine (NORVASC) 5 MG tablet Take 5 mg by mouth once daily.       atorvastatin (LIPITOR) 40 MG tablet Take 40 mg by mouth once daily.       lisinopril (PRINIVIL,ZESTRIL) 20 MG tablet Take 20 mg by mouth once daily.       zolpidem (AMBIEN) 10 mg Tab Take 5 mg by mouth nightly as needed.       ALPRAZolam (XANAX) 0.25 MG tablet Take 0.25 mg by mouth 3 (three) times daily.          Review of patient's allergies indicates:  No Known Allergies    Past Medical History:   Diagnosis Date    Hypertension      History reviewed. No pertinent surgical history.  Family History     None        Social History Main Topics    Smoking status: Current Every Day Smoker     Packs/day: 0.50     Types: Cigarettes    Smokeless tobacco: Never Used    Alcohol use No    Drug use: No    Sexual activity: Not on file     Review of Systems   Constitutional: Positive for activity change and fatigue.   HENT: Negative for nosebleeds and sore throat.    Eyes: Positive for visual disturbance. Negative for photophobia.   Respiratory: Negative for chest tightness and shortness of breath.    Cardiovascular: Negative for chest pain and palpitations.   Gastrointestinal: Negative for abdominal pain, constipation, nausea and vomiting.   Genitourinary: Negative for dysuria.   Musculoskeletal: Negative for back pain and neck pain.   Neurological: Positive for weakness. Negative for numbness and headaches.     Objective:     Weight: 62.1 kg (137 lb)  Body mass index is 22.11 kg/m².  Vital Signs (Most Recent):  Temp: 98.1 °F (36.7 °C) (06/29/18 2300)  Pulse: 62 (06/29/18 2300)  Resp: 20 (06/29/18 2300)  BP: (!) 182/77 (06/29/18 2300)  SpO2: 96 % (06/29/18 2300) Vital Signs (24h Range):  Temp:  [98.1 °F (36.7 °C)-98.4 °F (36.9 °C)] 98.1 °F (36.7 °C)  Pulse:  [] 62  Resp:  [20-41] 20  SpO2:  [95 %-98 %] 96 %  BP: (147-195)/(75-90) 182/77        Neurosurgery Physical  Exam    General: well developed, well nourished, no distress.   Head: normocephalic, atraumatic  Neurologic: Alert and oriented. Thought content appropriate.  GCS: Motor: 6/Verbal: 5/Eyes: 4 GCS Total: 15  Mental Status: Awake, Alert, Oriented x 4  Language: No aphasia  Speech: No dysarthria  Cranial nerves: face symmetric, tongue midline, CN II-XII grossly intact.   Eyes: pupils equal, round, reactive to light with accomodation, EOMI.  Pulmonary: normal respirations, no signs of respiratory distress  Abdomen: soft, non-distended  Sensory: intact to light touch throughout  Motor Strength: Moves all extremities spontaneously with good tone.  Full strength upper and lower extremities. No abnormal movements seen.     Strength  Deltoids Triceps Biceps Wrist Extension Wrist Flexion Hand    Upper: R 5/5 5/5 5/5 5/5 5/5 5/5    L 5/5 5/5 5/5 5/5 5/5 5/5     Iliopsoas Quadriceps Knee  Flexion Tibialis  anterior Gastro- cnemius EHL   Lower: R 5/5 5/5 5/5 5/5 5/5 5/5    L 5/5 5/5 5/5 5/5 5/5 5/5     DTR's - 2 + and symmetric in UE and LE  Pronator Drift: no drift noted  Finger-to-nose: Intact bilaterally  Somers: absent  Clonus: absent  Pulses: 2+ and symmetric radial and dorsalis pedis.  Skin: Skin is warm, dry and intact.    R hemianopsia      Significant Labs:    Recent Labs  Lab 06/29/18  1559   *      K 3.7      CO2 28   BUN 13   CREATININE 0.7   CALCIUM 10.5   MG 2.0       Recent Labs  Lab 06/29/18  1559   WBC 8.62   HGB 14.2   HCT 42.9          Recent Labs  Lab 06/29/18  1559   INR 0.9     Microbiology Results (last 7 days)     ** No results found for the last 168 hours. **        All pertinent labs from the last 24 hours have been reviewed.    Significant Diagnostics:  MRI: Mri Brain W Wo Contrast    Result Date: 6/29/2018  Multiple rim enhancing masses within the supratentorial and tentorial brain with the largest lesion in the left occipital lobe measuring 2.2 x 2.3 x 2.3 cm.  The  findings are most suggestive of metastatic disease to the brain. Susceptibility weighted artifact involving the lesion in the right CP angle may represent prior blood products.  No definite evidence of acute parenchymal hemorrhage. Scattered areas of diffusion the bilateral CP angles are felt to represent cellularity of the associated lesions without definitive evidence of infarction.

## 2018-06-30 NOTE — ASSESSMENT & PLAN NOTE
82F no PMH found to have multiple lesions in brain, largest in L occiptial lobe. Likely metastatic disease.    -- No acute neurosurgical intervention necessary.  -- Admitted to IM.  -- Heme/onc consult  -- CT c/a/p metastatic workup  -- Keppra 500 BID  -- Neurochecks  -- Medical management per primary team.  -- Will review images with staff in AM.  -- Family and patient report not wanting any invasive measures, but wanting to hear all options for treatment.  -- Will follow.
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
## 2018-06-30 NOTE — SUBJECTIVE & OBJECTIVE
Past Medical History:   Diagnosis Date    Hypertension      History reviewed. No pertinent surgical history.  History reviewed. No pertinent family history.  Social History   Substance Use Topics    Smoking status: Current Every Day Smoker     Packs/day: 0.50     Types: Cigarettes    Smokeless tobacco: Never Used    Alcohol use No     Review of patient's allergies indicates:  No Known Allergies    Medications: I have reviewed the current medication administration record.      (Not in a hospital admission)    Review of Systems   Constitutional: Negative for chills and fever.   HENT: Negative for congestion, sore throat and trouble swallowing.    Eyes: Negative for pain and visual disturbance.   Respiratory: Negative for cough and shortness of breath.    Cardiovascular: Negative for chest pain and palpitations.   Gastrointestinal: Negative for abdominal pain, diarrhea and vomiting.   Genitourinary: Negative for dysuria and hematuria.   Musculoskeletal: Negative for arthralgias.   Skin: Negative for wound.   Neurological: Positive for speech difficulty. Negative for dizziness, facial asymmetry, weakness, numbness and headaches.   Psychiatric/Behavioral: Negative for agitation and confusion.     Objective:     Vital Signs (Most Recent):  Temp: 98.4 °F (36.9 °C) (06/29/18 1520)  Pulse: 68 (06/29/18 1857)  Resp: 20 (06/29/18 1857)  BP: (!) 164/79 (06/29/18 1857)  SpO2: 96 % (06/29/18 1857)    Vital Signs Range (Last 24H):  Temp:  [98.4 °F (36.9 °C)]   Pulse:  []   Resp:  [20-41]   BP: (147-195)/(79-90)   SpO2:  [96 %-98 %]     Physical Exam   Constitutional: She appears well-developed and well-nourished.   Cardiovascular: Normal rate.    Pulmonary/Chest: Effort normal.   Skin: Skin is warm and dry.   Psychiatric: She has a normal mood and affect. Her behavior is normal. Judgment and thought content normal.       Neurological Exam:   LOC: alert  Attention Span: Good   Language: mild expressive  aphasia  Articulation: No dysarthria  Orientation: Person, Place, Time   Visual Fields: Hemianopsia right  EOM (CN III, IV, VI): Full/intact  Pupils (CN II, III): PERRL  Facial Sensation (CN V): Normal  Facial Movement (CN VII): Symmetric facial expression    Motor: Arm left  Normal 5/5  Leg left  Normal 5/5  Arm right  Normal 5/5  Leg right Normal 5/5  Cebellar: No evidence of appendicular or axial ataxia  Sensation: Intact to light touch, temperature and vibration  Tone: Normal tone throughout      Laboratory:  CMP:   Recent Labs  Lab 18  1559   CALCIUM 10.5   ALBUMIN 3.6   PROT 7.1      K 3.7   CO2 28      BUN 13   CREATININE 0.7   ALKPHOS 96   ALT 11   AST 15   BILITOT 0.5     CBC:   Recent Labs  Lab 18  1559   WBC 8.62   RBC 4.75   HGB 14.2   HCT 42.9      MCV 90   MCH 29.9   MCHC 33.1     Lipid Panel: No results for input(s): CHOL, LDLCALC, HDL, TRIG in the last 168 hours.  Coagulation:   Recent Labs  Lab 18  1559   INR 0.9     Hgb A1C: No results for input(s): HGBA1C in the last 168 hours.  TSH: No results for input(s): TSH in the last 168 hours.    Diagnostic Results:      Brain imagin2018 CT  Lesion identified in the left PCA territory with possible hemorrhagic component.  Concerning for metastatic disease.    2018 MRI with and without  Multiple rim enhancing masses within the supratentorial and tentorial brain with the largest lesion in the left occipital lobe measuring 2.2 x 2.3 x 2.3 cm.  The findings are most suggestive of metastatic disease to the brain.    Susceptibility weighted artifact involving the lesion in the right CP angle may represent prior blood products.  No definite evidence of acute parenchymal hemorrhage.    Scattered areas of diffusion the bilateral CP angles are felt to represent cellularity of the associated lesions without definitive evidence of infarction.

## 2018-06-30 NOTE — PROGRESS NOTES
MRI brain reviewed. Agree with IM admission and heme/onc consult at this time. Complete metastatic workup with CT c/a/p. May start patient on Keppra 500 BID seizure ppx. No acute neurosurgical intervention necessary at this time.  Further recommendations and full consult note to follow.

## 2018-06-30 NOTE — PLAN OF CARE
Problem: Physical Therapy Goal  Goal: Physical Therapy Goal  Goals to be met by: 7/10/18     Patient will increase functional independence with mobility by performin. Sit to stand transfer with Supervision  2. Bed to chair transfer with Supervision using Rolling Walker  3. Gait  x 140 feet with Supervision using Rolling Walker.     Outcome: Ongoing (interventions implemented as appropriate)  Evaluation complete.  Goals established to improve functional mobility.    DC rec's for HHPT    JULIANA Leahy IIIT, PT  2018

## 2018-06-30 NOTE — PT/OT/SLP EVAL
Occupational Therapy   Evaluation, Treatment and Discharge Note    Name: Alfreda Botello  MRN: [id number]  Admitting Diagnosis:  Brain metastasis      Recommendations:     Discharge Recommendations: home with home health  Discharge Equipment Recommendations:  shower chair, walker, rolling  Barriers to discharge:  None    History:     Occupational Profile:  Living Environment: Pt lives alone, with daughter next door; pt has a walk-in shower  Previous level of function: (I) but recently had declines in balance  Equipment Owned:  none  Assistance upon Discharge: available as needed    Past Medical History:   Diagnosis Date    Hypertension        History reviewed. No pertinent surgical history.    Subjective     Chief Complaint: being here  Patient/Family stated goals: go home  Communicated with: RN prior to session.  Pain/Comfort:  · Pain Rating 1: 0/10  · Pain Rating Post-Intervention 1: 0/10    Patients cultural, spiritual, Episcopal conflicts given the current situation: none stated    Objective:     Patient found with:  (lines intact)    General Precautions: Standard, fall   Orthopedic Precautions:N/A   Braces: N/A     Occupational Performance:    Bed Mobility:    · Patient completed Supine to Sit with stand by assistance    Functional Mobility/Transfers:  · Patient completed Sit <> Stand Transfer with contact guard assistance  with  no assistive device   · Functional Mobility: CGA gait    Activities of Daily Living:  · Toileting: stand by assistance at toilet    Cognitive/Visual Perceptual:  Cognitive/Psychosocial Skills:     -       Oriented to: increased time and cues to orient to year   -       Follows Commands/attention:Follows two-step commands  -       Communication: clear/fluent, but difficulty word finding  -       Memory: Impaired STM  -       Safety awareness/insight to disability: impaired   -       Mood/Affect/Coping skills/emotional control: Appropriate to situation  Visual/Perceptual:     "  -Intact    Physical Exam:  Balance:    -       Fair  Postural examination/scapula alignment:    -       No postural abnormalities identified  Dominant hand:    -       R  Upper Extremity Range of Motion:     -       Right Upper Extremity: WFL  -       Left Upper Extremity: WFL  Upper Extremity Strength:    -       Right Upper Extremity: WFL  -       Left Upper Extremity: WFL   Strength:    -       Right Upper Extremity: WFL  -       Left Upper Extremity: WFL  Fine Motor Coordination:    -       Intact  Gross motor coordination:   WFL    Patient left sitting EOB with all lines intact and call button in reach    [facility] 6 Click:  [facility] Total Score: 21    Treatment & Education:  Pt ed re OT role and POC. Pt is near (I), but requiring Supervision for self care and CGA for mobility, SBA with RW  Education:    Assessment:     Alfreda Botello is a 82 y.o. female with a medical diagnosis of Brain metastasis. At this time, patient is functioning at their prior level of function and does not require further acute OT services.     Clinical Decision Makin.  OT Low:  "Pt evaluation falls under low complexity for evaluation coding due to performance deficits noted in 1-3 areas as stated above and 0 co-morbities affecting current functional status. Data obtained from problem focused assessments. No modifications or assistance was required for completion of evaluation. Only brief occupational profile and history review completed."     Plan:     During this hospitalization, patient does not require further acute OT services.  Please re-consult if situation changes.    · Plan of Care Reviewed with: patient, daughter    This Plan of care has been discussed with the patient who was involved in its development and understands and is in agreement with the identified goals and treatment plan    GOALS:    Occupational Therapy Goals     Not on file          Multidisciplinary Problems (Resolved)        Problem: Occupational " Therapy Goal    Goal Priority Disciplines Outcome Interventions   Occupational Therapy Goal   (Resolved)     OT, PT/OT Outcome(s) achieved                    Time Tracking:     OT Date of Treatment: 06/30/18  OT Start Time: 1634  OT Stop Time: 1700  OT Total Time (min): 26 min    Billable Minutes:Evaluation 18 minutes  Therapeutic Activity 8 minutes    JOJO Tenorio  6/30/2018  Pager: [phone]

## 2018-06-30 NOTE — PLAN OF CARE
Problem: Fall Risk (Adult)  Goal: Absence of Falls  Patient will demonstrate the desired outcomes by discharge/transition of care.   Outcome: Ongoing (interventions implemented as appropriate)  Patient has had no falls this shift.  Bed is in lowest position and locked.  Assist of 1 for ambulation to the bathroom.  Daughter is at bedside.  Patient has had no c/o  Or s/s pain or discomfort this shift.  NPO since midnight.
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
## 2018-06-30 NOTE — ED PROVIDER NOTES
7: 54 PM  Pt was seen by vascular neurology. There are metastatic lesions on the MRI. The pt will be admitted to  with neurosurgery consult.     I, Edward Carlisle, am scribing for, and in the presence of, Dr. Cruz. I performed the above scribed service and the documentation accurately describes the services I performed. I attest to the accuracy of the note.
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
## 2018-06-30 NOTE — PLAN OF CARE
SonDylan has POA. Patient preference for home health is [facility]. PRISCILLA Uriostegui to follow.     06/30/18 1524   Discharge Assessment   Assessment Type Discharge Planning Assessment   Confirmed/corrected address and phone number on facesheet? Yes   Assessment information obtained from? Patient;Caregiver;Medical Record  (family at bedside)   Expected Length of Stay (days) 1   Communicated expected length of stay with patient/caregiver yes   Prior to hospitilization cognitive status: Alert/Oriented   Prior to hospitalization functional status: Independent   Current cognitive status: Alert/Oriented  (some memory issues recently)   Current Functional Status: Independent   Lives With alone  (granddaughter lives next door)   Able to Return to Prior Arrangements yes   Is patient able to care for self after discharge? Unable to determine at this time (comments)   Who are your caregiver(s) and their phone number(s)? children can assist as needed   Patient's perception of discharge disposition home or selfcare;home health   Readmission Within The Last 30 Days no previous admission in last 30 days   Patient currently being followed by outpatient case management? No   Patient currently receives any other outside agency services? No   Equipment Currently Used at Home none   Do you have any problems affording any of your prescribed medications? No   Is the patient taking medications as prescribed? yes   Does the patient have transportation home? Yes   Transportation Available family or friend will provide   Does the patient receive services at the [facility]mad[facility] Clinic? No   Discharge Plan A Home Health;Home with family   Patient/Family In Agreement With Plan yes

## 2018-06-30 NOTE — PLAN OF CARE
Problem: SLP Goal  Goal: SLP Goal  Bedside swallow study completed. No further ST recommended at this time.   Any Geiger CCC-SLP  6/30/2018

## 2018-06-30 NOTE — PT/OT/SLP EVAL
Speech Language Pathology Evaluation  Bedside Swallow  Discharge    Patient Name:  Alfreda Botello   MRN:  [id number]  Admitting Diagnosis: Brain metastasis    Recommendations:                 General Recommendations:  Follow-up not indicated  Diet recommendations:  Regular, Thin   Aspiration Precautions: 1 bite/sip at a time, Feed only when awake/alert, HOB to 90 degrees and Standard aspiration precautions   General Precautions: Standard, fall  Communication strategies:  none    History:     Past Medical History:   Diagnosis Date    Hypertension        History reviewed. No pertinent surgical history.    Social History: Patient lives with family    Prior diet: regular/thin.      Subjective     Awake/alert      Pain/Comfort:  · Pain Rating 1: 0/10  · Pain Rating Post-Intervention 1: 0/10    Objective:     Oral Musculature Evaluation  · Oral Musculature: WFL  · Dentition: present and adequate  · Mandibular Strength and Mobility: WFL  · Oral Labial Strength and Mobility: WFL  · Lingual Strength and Mobility: WFL  · Volitional Cough: adequate  · Volitional Swallow: timely  · Voice Prior to PO Intake: clear    Bedside Swallow Eval:   Consistencies Assessed:  · Thin liquids x7 straw  · Puree x2  · Solids x1     Oral Phase:   · WFL    Pharyngeal Phase:   · no overt clinical signs/symptoms of aspiration    Compensatory Strategies  · None        Assessment:     Alfreda Botello is a 82 y.o. female with oral and pharyngeal phases of swallow deemed WFL.     Goals:    SLP Goals        Problem: SLP Goal    Goal Priority Disciplines Outcome   SLP Goal     SLP                    Plan:     · Plan of Care reviewed with:  patient, daughter   · SLP Follow-Up:  No       Discharge recommendations:    No St f/u      Time Tracking:     SLP Treatment Date:   06/30/18  Speech Start Time:  0711  Speech Stop Time:  0719     Speech Total Time (min):  8 min    Billable Minutes: Eval Swallow and Oral Function 8    Any Geiger  CCC-SLP  06/30/2018

## 2018-07-01 VITALS
DIASTOLIC BLOOD PRESSURE: 64 MMHG | SYSTOLIC BLOOD PRESSURE: 133 MMHG | BODY MASS INDEX: 21.51 KG/M2 | TEMPERATURE: 98 F | HEART RATE: 60 BPM | OXYGEN SATURATION: 95 % | WEIGHT: 133.81 LBS | RESPIRATION RATE: 19 BRPM | HEIGHT: 66 IN

## 2018-07-01 LAB
ALBUMIN SERPL BCP-MCNC: 3.3 G/DL
ALP SERPL-CCNC: 74 U/L
ALT SERPL W/O P-5'-P-CCNC: 8 U/L
ANION GAP SERPL CALC-SCNC: 5 MMOL/L
AST SERPL-CCNC: 11 U/L
BASOPHILS # BLD AUTO: 0.01 K/UL
BASOPHILS NFR BLD: 0.1 %
BILIRUB SERPL-MCNC: 0.4 MG/DL
BUN SERPL-MCNC: 16 MG/DL
CALCIUM SERPL-MCNC: 10.5 MG/DL
CHLORIDE SERPL-SCNC: 106 MMOL/L
CO2 SERPL-SCNC: 29 MMOL/L
CREAT SERPL-MCNC: 0.7 MG/DL
DIFFERENTIAL METHOD: ABNORMAL
EOSINOPHIL # BLD AUTO: 0 K/UL
EOSINOPHIL NFR BLD: 0 %
ERYTHROCYTE [DISTWIDTH] IN BLOOD BY AUTOMATED COUNT: 13.7 %
EST. GFR  (AFRICAN AMERICAN): >60 ML/MIN/1.73 M^2
EST. GFR  (NON AFRICAN AMERICAN): >60 ML/MIN/1.73 M^2
GLUCOSE SERPL-MCNC: 146 MG/DL
HCT VFR BLD AUTO: 39.7 %
HGB BLD-MCNC: 12.8 G/DL
IMM GRANULOCYTES # BLD AUTO: 0.05 K/UL
IMM GRANULOCYTES NFR BLD AUTO: 0.6 %
LYMPHOCYTES # BLD AUTO: 1.2 K/UL
LYMPHOCYTES NFR BLD: 14.9 %
MAGNESIUM SERPL-MCNC: 2.3 MG/DL
MCH RBC QN AUTO: 29.4 PG
MCHC RBC AUTO-ENTMCNC: 32.2 G/DL
MCV RBC AUTO: 91 FL
MONOCYTES # BLD AUTO: 0.4 K/UL
MONOCYTES NFR BLD: 5.3 %
NEUTROPHILS # BLD AUTO: 6.1 K/UL
NEUTROPHILS NFR BLD: 79.1 %
NRBC BLD-RTO: 0 /100 WBC
PHOSPHATE SERPL-MCNC: 4.3 MG/DL
PLATELET # BLD AUTO: 226 K/UL
PMV BLD AUTO: 11.1 FL
POTASSIUM SERPL-SCNC: 4.7 MMOL/L
PROT SERPL-MCNC: 6 G/DL
RBC # BLD AUTO: 4.36 M/UL
SODIUM SERPL-SCNC: 140 MMOL/L
WBC # BLD AUTO: 7.71 K/UL

## 2018-07-01 PROCEDURE — 25000003 PHARM REV CODE 250: Performed by: PHYSICIAN ASSISTANT

## 2018-07-01 PROCEDURE — 99222 1ST HOSP IP/OBS MODERATE 55: CPT | Mod: GC,,, | Performed by: INTERNAL MEDICINE

## 2018-07-01 PROCEDURE — 85025 COMPLETE CBC W/AUTO DIFF WBC: CPT

## 2018-07-01 PROCEDURE — 36415 COLL VENOUS BLD VENIPUNCTURE: CPT

## 2018-07-01 PROCEDURE — 84100 ASSAY OF PHOSPHORUS: CPT

## 2018-07-01 PROCEDURE — 25000003 PHARM REV CODE 250: Performed by: HOSPITALIST

## 2018-07-01 PROCEDURE — 80053 COMPREHEN METABOLIC PANEL: CPT

## 2018-07-01 PROCEDURE — 63600175 PHARM REV CODE 636 W HCPCS: Performed by: HOSPITALIST

## 2018-07-01 PROCEDURE — 83735 ASSAY OF MAGNESIUM: CPT

## 2018-07-01 PROCEDURE — 99239 HOSP IP/OBS DSCHRG MGMT >30: CPT | Mod: ,,, | Performed by: HOSPITALIST

## 2018-07-01 RX ORDER — DEXAMETHASONE 4 MG/1
4 TABLET ORAL 2 TIMES DAILY WITH MEALS
Qty: 28 TABLET | Refills: 0 | Status: SHIPPED | OUTPATIENT
Start: 2018-07-01 | End: 2018-07-13 | Stop reason: SDUPTHER

## 2018-07-01 RX ORDER — LEVETIRACETAM 500 MG/1
500 TABLET ORAL 2 TIMES DAILY
Qty: 60 TABLET | Refills: 11 | Status: SHIPPED | OUTPATIENT
Start: 2018-07-01 | End: 2018-07-13 | Stop reason: SDUPTHER

## 2018-07-01 RX ORDER — LEVOFLOXACIN 750 MG/1
750 TABLET ORAL DAILY
Qty: 3 TABLET | Refills: 0 | Status: SHIPPED | OUTPATIENT
Start: 2018-07-01 | End: 2018-07-09

## 2018-07-01 RX ADMIN — AMLODIPINE BESYLATE 5 MG: 5 TABLET ORAL at 08:07

## 2018-07-01 RX ADMIN — LISINOPRIL 20 MG: 20 TABLET ORAL at 08:07

## 2018-07-01 RX ADMIN — LEVETIRACETAM 500 MG: 500 TABLET, FILM COATED ORAL at 08:07

## 2018-07-01 RX ADMIN — CETIRIZINE HYDROCHLORIDE 5 MG: 5 TABLET, FILM COATED ORAL at 08:07

## 2018-07-01 RX ADMIN — DEXAMETHASONE 4 MG: 4 TABLET ORAL at 12:07

## 2018-07-01 RX ADMIN — DEXAMETHASONE 4 MG: 4 TABLET ORAL at 06:07

## 2018-07-01 RX ADMIN — STANDARDIZED SENNA CONCENTRATE AND DOCUSATE SODIUM 1 TABLET: 8.6; 5 TABLET, FILM COATED ORAL at 08:07

## 2018-07-01 NOTE — CONSULTS
[facility]  Hematology/Oncology  Consult Note    Patient Name: Alfreda Botello  MRN: [id number]  Admission Date: 6/29/2018  Hospital Length of Stay: 2 days  Code Status: Full Code   Attending Provider: Elissa Heard MD  Consulting Provider: John Kirkland MD  Primary Care Physician: Louis Estrada Iii, MD  Principal Problem:Brain metastasis    Inpatient consult to Hematology/Oncology  Consult performed by: JOHN KIRKLAND  Consult ordered by: ELISSA HEARD        Subjective:     HPI:  Patient is a 82yoF with PMHx of HTN who presented c/o abnormal CT. History taken per chart review and family as patient is a poor historian given current symptoms. Patient first presented to PCP with complaints of speech and gait difficulties. CT head was concerning for stroke. Subsequent work up in the ED with MRI brain showed multiple lytic lesions concerning for metastatic disease. Neurosurgery consulted, but patient declined surgery. CT C/A/P ordered to investigate for primary lesion, and heme/onc consulted for management recommendations. CT C/A/P showed adrenal lesion and renal lesion. MRI ordered to better evaluate these lesions revealed benign etiology (adrenal lesions suggestve of adrenal adenoma and renal lesion consistent with hemorrhagic cyst).     Oncology Treatment Plan:   [No treatment plan]    Medications:  Continuous Infusions:  Scheduled Meds:   amLODIPine  5 mg Oral Daily    cefTRIAXone (ROCEPHIN) IVPB  1 g Intravenous Q24H    cetirizine  5 mg Oral Daily    dexamethasone  4 mg Oral Q6H    fluticasone  2 spray Each Nare Daily    levETIRAcetam  500 mg Oral BID    lisinopril  20 mg Oral Daily    senna-docusate 8.6-50 mg  1 tablet Oral Daily    zolpidem  5 mg Oral QHS     PRN Meds:acetaminophen, ALPRAZolam, ondansetron, polyethylene glycol, promethazine, sodium chloride 0.9%     Review of patient's allergies indicates:  No Known Allergies     Past Medical History:   Diagnosis Date     Hypertension      History reviewed. No pertinent surgical history.  Family History     None        Social History Main Topics    Smoking status: Current Every Day Smoker     Packs/day: 0.50     Types: Cigarettes    Smokeless tobacco: Never Used    Alcohol use No    Drug use: No    Sexual activity: Not on file       Review of Systems   Constitutional: Negative for chills and fever.   HENT: Negative for sore throat and trouble swallowing.    Respiratory: Negative for cough and shortness of breath.    Cardiovascular: Negative for chest pain and palpitations.   Gastrointestinal: Negative for abdominal pain, nausea and vomiting.   Genitourinary: Negative for dysuria and hematuria.   Musculoskeletal: Positive for gait problem. Negative for arthralgias and myalgias.   Skin: Negative for rash and wound.   Neurological: Positive for speech difficulty and headaches.   Psychiatric/Behavioral: Positive for confusion. Negative for agitation.     Objective:     Vital Signs (Most Recent):  Temp: 98.2 °F (36.8 °C) (07/01/18 0733)  Pulse: 62 (07/01/18 0733)  Resp: 16 (07/01/18 0733)  BP: (!) 148/76 (07/01/18 0733)  SpO2: 95 % (07/01/18 0733) Vital Signs (24h Range):  Temp:  [97.8 °F (36.6 °C)-98.4 °F (36.9 °C)] 98.2 °F (36.8 °C)  Pulse:  [59-67] 62  Resp:  [15-16] 16  SpO2:  [94 %-95 %] 95 %  BP: (140-152)/(71-76) 148/76     Weight: 60.7 kg (133 lb 13.1 oz)  Body mass index is 21.6 kg/m².  Body surface area is 1.68 meters squared.      Intake/Output Summary (Last 24 hours) at 07/01/18 1302  Last data filed at 06/30/18 1844   Gross per 24 hour   Intake              840 ml   Output                0 ml   Net              840 ml       Physical Exam   Constitutional: She appears well-developed and well-nourished. No distress.   HENT:   Head: Normocephalic and atraumatic.   Right Ear: External ear normal.   Left Ear: External ear normal.   Eyes: EOM are normal. Pupils are equal, round, and reactive to light. Right eye exhibits no  discharge. Left eye exhibits no discharge.   Neck: Normal range of motion. Neck supple.   Cardiovascular: Normal rate and regular rhythm.  Exam reveals no gallop and no friction rub.    Pulmonary/Chest: Effort normal and breath sounds normal. No stridor. No respiratory distress. She has no wheezes.   Abdominal: Soft. Bowel sounds are normal. There is no tenderness.   Musculoskeletal: Normal range of motion. She exhibits no tenderness or deformity.   Neurological: She is alert.   +word finding difficulties  +difficulties with complex commands   Skin: Skin is warm and dry. She is not diaphoretic.   Psychiatric: She has a normal mood and affect. Her behavior is normal.       Significant Labs:   CBC:   Recent Labs  Lab 06/29/18  1559 07/01/18  0603   WBC 8.62 7.71   HGB 14.2 12.8   HCT 42.9 39.7    226    and CMP:   Recent Labs  Lab 06/29/18  1559 07/01/18  0603    140   K 3.7 4.7    106   CO2 28 29   * 146*   BUN 13 16   CREATININE 0.7 0.7   CALCIUM 10.5 10.5   PROT 7.1 6.0   ALBUMIN 3.6 3.3*   BILITOT 0.5 0.4   ALKPHOS 96 74   AST 15 11   ALT 11 8*   ANIONGAP 8 5*   EGFRNONAA >60.0 >60.0       Diagnostic Results:  I have reviewed all pertinent imaging results/findings within the past 24 hours.     MRI Abdo 6/30/18:  Right lower pole 1.4 cm lesion, correlating with the 06/29/2018 CT chest exam, consistent with a hemorrhagic cyst.    Small adrenal nodules with partial signal dropout on T1 opposed phase sequences, most suggestive of adrenal adenomas.    CT C/A/P 6/29/18:  1.4 cm exophytic rounded right lower pole renal lesion concerning for an underlying renal mass.  Further evaluation with MRI or CT renal mass protocol recommended.    Bilateral indeterminate adrenal gland nodules concerning for adrenal metastases versus adenomas.  Further evaluation with MRI or CT adrenal mass protocol.    Ectatic descending thoracic aorta.    Cholelithiasis.    Colonic diverticulosis.    MRI Brain  6/29/18:  Multiple rim enhancing masses within the supratentorial and tentorial brain with the largest lesion in the left occipital lobe measuring 2.2 x 2.3 x 2.3 cm.  The findings are most suggestive of metastatic disease to the brain.    Susceptibility weighted artifact involving the lesion in the right CP angle may represent prior blood products.  No definite evidence of acute parenchymal hemorrhage.    Scattered areas of diffusion the bilateral CP angles are felt to represent cellularity of the associated lesions without definitive evidence of infarction.    Assessment/Plan:     * Brain metastasis    - MRI Brain (6/29/18) showed multiple rim enhancing masses with the largeest measuring 2.2 x 2.3 x 2.3cm  - CT C/A/P suspicious for renal and adrenal lesion but subsequent MRI  revealed benign etiology (adrenal lesions suggestve of adrenal adenoma and renal lesion consistent with hemorrhagic cyst)  - radiology studies reviewed with radiology and no clear primary lesion found  - unable to give prognostic information or treatment options without diagnosis  - patient does not want to pursue brain biopsy  - patient wants to leave today  - recommend radiation oncology evaluation (please place referral if not done inpatient) for consideration of XRT  - reasonable to follow up with hematology/oncology clinic (would like to be seen in [facility]) and will help schedule  - recommend PET scan to look for other areas to biopsy       Thank you for allowing us to participate in the care of this patient.   Please do not hesitate to call with any questions.    Gabo Mccray MD (PGY-5)  Hematology/Oncology Fellow  Will discuss with Dr. Weinberg (Hematology/Oncology Staff)
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
## 2018-07-01 NOTE — SUBJECTIVE & OBJECTIVE
Subjective:     Interval History: ***    Objective:     Vital Signs (Most Recent):  Temp: 98.2 °F (36.8 °C) (18)  Pulse: 62 (18)  Resp: 16 (18)  BP: (!) 148/76 (18)  SpO2: 95 % (18) Vital Signs (24h Range):  Temp:  [97.8 °F (36.6 °C)-98.4 °F (36.9 °C)] 98.2 °F (36.8 °C)  Pulse:  [59-67] 62  Resp:  [15-16] 16  SpO2:  [94 %-95 %] 95 %  BP: (140-152)/(71-76) 148/76     Weight: 60.7 kg (133 lb 13.1 oz)  Body mass index is 21.6 kg/m².  Body surface area is 1.68 meters squared.    ECOG SCORE         [unfilled]    Intake/Output - Last 3 Shifts       700 -  -  0659 700 -  0659    P.O.  840     Total Intake(mL/kg)  840 (13.8)     Net   +840             Urine Occurrence  4 x     Stool Occurrence  1 x           Physical Exam    Significant Labs:   {Select Labs:06202}    Diagnostic Results:  {Select Imagin}

## 2018-07-01 NOTE — ASSESSMENT & PLAN NOTE
- given new neuro complains, UA was checked  - + nitrite   - start IV rocephin, plan to convert to PO Levaquin for 5 days total treatment
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
## 2018-07-01 NOTE — SUBJECTIVE & OBJECTIVE
Interval History:  Doing well, no complains. Request to go home. Family is ok with plan to follow up in Oncology clinic. Pt to go home with Home health    Review of Systems   Constitutional: Negative for chills, fatigue, fever.   HENT: Negative for sore throat, trouble swallowing.    Eyes: Negative for photophobia, visual disturbance.   Respiratory: Negative for cough, shortness of breath.    Cardiovascular: Negative for chest pain, palpitations, leg swelling.   Gastrointestinal: Negative for abdominal pain, constipation, diarrhea, nausea, vomiting.   Endocrine: Negative for cold intolerance, heat intolerance.   Genitourinary: Negative for dysuria, frequency.   Musculoskeletal: Negative for arthralgias, myalgias.   Skin: Negative for rash, wound, erythema   Neurological: + weakness   Psychiatric/Behavioral: + speech difficulty  All other systems reviewed and are negative.  Objective:     Vital Signs (Most Recent):  Temp: 98.2 °F (36.8 °C) (07/01/18 0733)  Pulse: 60 (07/01/18 1230)  Resp: 19 (07/01/18 1230)  BP: 133/64 (07/01/18 1230)  SpO2: 95 % (07/01/18 1230) Vital Signs (24h Range):  Temp:  [97.8 °F (36.6 °C)-98.4 °F (36.9 °C)] 98.2 °F (36.8 °C)  Pulse:  [59-67] 60  Resp:  [15-19] 19  SpO2:  [94 %-95 %] 95 %  BP: (133-152)/(64-76) 133/64     Weight: 60.7 kg (133 lb 13.1 oz)  Body mass index is 21.6 kg/m².    Intake/Output Summary (Last 24 hours) at 07/01/18 1531  Last data filed at 06/30/18 1844   Gross per 24 hour   Intake              840 ml   Output                0 ml   Net              840 ml      Physical Exam  Constitutional: Appears well-developed and well-nourished.   Head: Normocephalic and atraumatic.   Mouth/Throat: Oropharynx is clear and moist.   Eyes: EOM are normal. Pupils are equal, round, and reactive to light. No scleral icterus.   Neck: Normal range of motion. Neck supple.   Cardiovascular: Normal heart rate.  Regular heart rhythm.  No murmur heard.  Pulmonary/Chest: Effort normal and breath  sounds normal. No respiratory distress. No wheezes, rales, or rhonchi  Abdominal: Soft. Bowel sounds are normal.  No distension.  No tenderness  Musculoskeletal: Normal range of motion. No edema.   Neurological: Alert and oriented to person, place, and time.   Skin: Skin is warm and dry.   Psychiatric: Normal mood and affect. Behavior is normal.    MELD-Na score: 6 at 7/1/2018  6:03 AM  MELD score: 6 at 7/1/2018  6:03 AM  Calculated from:  Serum Creatinine: 0.7 mg/dL (Rounded to 1) at 7/1/2018  6:03 AM  Serum Sodium: 140 mmol/L (Rounded to 137) at 7/1/2018  6:03 AM  Total Bilirubin: 0.4 mg/dL (Rounded to 1) at 7/1/2018  6:03 AM  INR(ratio): 0.9 (Rounded to 1) at 6/29/2018  3:59 PM  Age: 82 years    Significant Labs:  CBC:    Recent Labs  Lab 06/29/18  1559 07/01/18  0603   WBC 8.62 7.71   HGB 14.2 12.8   HCT 42.9 39.7    226     CMP:    Recent Labs  Lab 06/29/18  1559 07/01/18  0603    140   K 3.7 4.7    106   CO2 28 29   * 146*   BUN 13 16   CREATININE 0.7 0.7   CALCIUM 10.5 10.5   PROT 7.1 6.0   ALBUMIN 3.6 3.3*   BILITOT 0.5 0.4   ALKPHOS 96 74   AST 15 11   ALT 11 8*   ANIONGAP 8 5*   EGFRNONAA >60.0 >60.0     PTINR:    Recent Labs  Lab 06/29/18  1559   INR 0.9       Significant Procedures:   Dobutamine Stress Test with Color Flow: No results found for this or any previous visit.    None

## 2018-07-01 NOTE — ASSESSMENT & PLAN NOTE
Right Homonymous Hemianopsia  - CT head remarkable for an acute left occipital and left parieto-occipital infarct with hemorrhagic transformation  - no interventions  - PO steroid started for 2 weeks per discussion with Oncology  - Evaluated by Vascular Neurology - suggest PT/OT/SLP eval  - pt was to go home, will set up home health

## 2018-07-01 NOTE — SUBJECTIVE & OBJECTIVE
Oncology Treatment Plan:   [No treatment plan]    Medications:  Continuous Infusions:  Scheduled Meds:   amLODIPine  5 mg Oral Daily    cefTRIAXone (ROCEPHIN) IVPB  1 g Intravenous Q24H    cetirizine  5 mg Oral Daily    dexamethasone  4 mg Oral Q6H    fluticasone  2 spray Each Nare Daily    levETIRAcetam  500 mg Oral BID    lisinopril  20 mg Oral Daily    senna-docusate 8.6-50 mg  1 tablet Oral Daily    zolpidem  5 mg Oral QHS     PRN Meds:acetaminophen, ALPRAZolam, ondansetron, polyethylene glycol, promethazine, sodium chloride 0.9%     Review of patient's allergies indicates:  No Known Allergies     Past Medical History:   Diagnosis Date    Hypertension      History reviewed. No pertinent surgical history.  Family History     None        Social History Main Topics    Smoking status: Current Every Day Smoker     Packs/day: 0.50     Types: Cigarettes    Smokeless tobacco: Never Used    Alcohol use No    Drug use: No    Sexual activity: Not on file       Review of Systems   Constitutional: Negative for chills and fever.   HENT: Negative for sore throat and trouble swallowing.    Respiratory: Negative for cough and shortness of breath.    Cardiovascular: Negative for chest pain and palpitations.   Gastrointestinal: Negative for abdominal pain, nausea and vomiting.   Genitourinary: Negative for dysuria and hematuria.   Musculoskeletal: Positive for gait problem. Negative for arthralgias and myalgias.   Skin: Negative for rash and wound.   Neurological: Positive for speech difficulty and headaches.   Psychiatric/Behavioral: Positive for confusion. Negative for agitation.     Objective:     Vital Signs (Most Recent):  Temp: 98.2 °F (36.8 °C) (07/01/18 0733)  Pulse: 62 (07/01/18 0733)  Resp: 16 (07/01/18 0733)  BP: (!) 148/76 (07/01/18 0733)  SpO2: 95 % (07/01/18 0733) Vital Signs (24h Range):  Temp:  [97.8 °F (36.6 °C)-98.4 °F (36.9 °C)] 98.2 °F (36.8 °C)  Pulse:  [59-67] 62  Resp:  [15-16] 16  SpO2:  [94  %-95 %] 95 %  BP: (140-152)/(71-76) 148/76     Weight: 60.7 kg (133 lb 13.1 oz)  Body mass index is 21.6 kg/m².  Body surface area is 1.68 meters squared.      Intake/Output Summary (Last 24 hours) at 07/01/18 1302  Last data filed at 06/30/18 1844   Gross per 24 hour   Intake              840 ml   Output                0 ml   Net              840 ml       Physical Exam   Constitutional: She appears well-developed and well-nourished. No distress.   HENT:   Head: Normocephalic and atraumatic.   Right Ear: External ear normal.   Left Ear: External ear normal.   Eyes: EOM are normal. Pupils are equal, round, and reactive to light. Right eye exhibits no discharge. Left eye exhibits no discharge.   Neck: Normal range of motion. Neck supple.   Cardiovascular: Normal rate and regular rhythm.  Exam reveals no gallop and no friction rub.    Pulmonary/Chest: Effort normal and breath sounds normal. No stridor. No respiratory distress. She has no wheezes.   Abdominal: Soft. Bowel sounds are normal. There is no tenderness.   Musculoskeletal: Normal range of motion. She exhibits no tenderness or deformity.   Neurological: She is alert.   +word finding difficulties  +difficulties with complex commands   Skin: Skin is warm and dry. She is not diaphoretic.   Psychiatric: She has a normal mood and affect. Her behavior is normal.       Significant Labs:   CBC:   Recent Labs  Lab 06/29/18  1559 07/01/18  0603   WBC 8.62 7.71   HGB 14.2 12.8   HCT 42.9 39.7    226    and CMP:   Recent Labs  Lab 06/29/18  1559 07/01/18  0603    140   K 3.7 4.7    106   CO2 28 29   * 146*   BUN 13 16   CREATININE 0.7 0.7   CALCIUM 10.5 10.5   PROT 7.1 6.0   ALBUMIN 3.6 3.3*   BILITOT 0.5 0.4   ALKPHOS 96 74   AST 15 11   ALT 11 8*   ANIONGAP 8 5*   EGFRNONAA >60.0 >60.0       Diagnostic Results:  I have reviewed all pertinent imaging results/findings within the past 24 hours.     MRI Abdo 6/30/18:  Right lower pole 1.4 cm lesion,  correlating with the 06/29/2018 CT chest exam, consistent with a hemorrhagic cyst.    Small adrenal nodules with partial signal dropout on T1 opposed phase sequences, most suggestive of adrenal adenomas.    CT C/A/P 6/29/18:  1.4 cm exophytic rounded right lower pole renal lesion concerning for an underlying renal mass.  Further evaluation with MRI or CT renal mass protocol recommended.    Bilateral indeterminate adrenal gland nodules concerning for adrenal metastases versus adenomas.  Further evaluation with MRI or CT adrenal mass protocol.    Ectatic descending thoracic aorta.    Cholelithiasis.    Colonic diverticulosis.    MRI Brain 6/29/18:  Multiple rim enhancing masses within the supratentorial and tentorial brain with the largest lesion in the left occipital lobe measuring 2.2 x 2.3 x 2.3 cm.  The findings are most suggestive of metastatic disease to the brain.    Susceptibility weighted artifact involving the lesion in the right CP angle may represent prior blood products.  No definite evidence of acute parenchymal hemorrhage.    Scattered areas of diffusion the bilateral CP angles are felt to represent cellularity of the associated lesions without definitive evidence of infarction.

## 2018-07-01 NOTE — PLAN OF CARE
Pt being discharged home today.  Family has chosen [facility], [phone].  CM did confirm address, and phone number, contact will be Kimmie Bullard, [phone].  Pt will also need a walker with wheels at discharge.  Put in call to [employer], and faxed orders and paperwork to [phone].  Pt's family is providing transportation home.  Pt ready to discharge home after walker with wheels is delivered.  Family did not want the shower chair.

## 2018-07-02 ENCOUNTER — PATIENT MESSAGE (OUTPATIENT)
Dept: HEMATOLOGY/ONCOLOGY | Facility: CLINIC | Age: 83
End: 2018-07-02

## 2018-07-02 ENCOUNTER — TELEPHONE (OUTPATIENT)
Dept: HEMATOLOGY/ONCOLOGY | Facility: CLINIC | Age: 83
End: 2018-07-02

## 2018-07-03 ENCOUNTER — INITIAL CONSULT (OUTPATIENT)
Dept: HEMATOLOGY/ONCOLOGY | Facility: CLINIC | Age: 83
End: 2018-07-03
Payer: MEDICARE

## 2018-07-03 VITALS
WEIGHT: 137.56 LBS | HEART RATE: 62 BPM | DIASTOLIC BLOOD PRESSURE: 64 MMHG | SYSTOLIC BLOOD PRESSURE: 132 MMHG | BODY MASS INDEX: 23.49 KG/M2 | TEMPERATURE: 99 F | HEIGHT: 64 IN | OXYGEN SATURATION: 98 %

## 2018-07-03 DIAGNOSIS — C79.31 BRAIN METASTASIS: Primary | ICD-10-CM

## 2018-07-03 PROCEDURE — 99999 PR PBB SHADOW E&M-EST. PATIENT-LVL III: CPT | Mod: PBBFAC,,, | Performed by: INTERNAL MEDICINE

## 2018-07-03 PROCEDURE — 3075F SYST BP GE 130 - 139MM HG: CPT | Mod: CPTII,S$GLB,, | Performed by: INTERNAL MEDICINE

## 2018-07-03 PROCEDURE — 3078F DIAST BP <80 MM HG: CPT | Mod: CPTII,S$GLB,, | Performed by: INTERNAL MEDICINE

## 2018-07-03 PROCEDURE — 99215 OFFICE O/P EST HI 40 MIN: CPT | Mod: S$GLB,,, | Performed by: INTERNAL MEDICINE

## 2018-07-03 NOTE — PROGRESS NOTES
Subjective:       Patient ID: Alfreda Botello is a 82 y.o. female.    Chief Complaint: No chief complaint on file.    HPI Ms. Botello is a pleasant 82-year-old female who presented to the ER a few days ago with speech and gait difficulties that have been going on for a month, but getting worse for one week prior to her presentation.  CT head was concerning for acute left occipital and left parietooccipital infarction associated with hemorrhage.  An MRI of the brain showed multiple rim-enhancing masses within the supratentorial and infratentorial brain with the largest lesion in the left occipital lobe measuring 2.3 cm, most suggestive of metastatic disease.  CT of the chest and abdomen revealed a 1.4 cm exophytic rounded right lower pole renal lesion concerning for an underlying renal mass along with bilateral indeterminate adrenal gland nodules.  MRI of the abdomen showed that the right lower pole 1.4 cm lesion is consistent with a hemorrhagic cyst and small adrenal nodules are most suggestive of adrenal adenomas.    The patient did not want a biopsy of the metastatic lesion in the brain.  She has been started on Keppra and Decadron 4 mg twice daily and was discharged home per her request.  She is referred here per her request to discuss further treatment options.  She is accompanied to the clinic by her son and daughter.  She is in a wheelchair.  She is awake and alert.  She has difficulty finding words.  She has some gait instability.  Otherwise, she does not have any pain.    Review of Systems   Constitutional: Negative for fever and unexpected weight change.   HENT: Negative for mouth sores and nosebleeds.    Eyes: Negative for photophobia and pain.   Respiratory: Negative for shortness of breath and wheezing.    Cardiovascular: Negative for chest pain and palpitations.   Gastrointestinal: Negative for abdominal pain and vomiting.   Genitourinary: Negative for flank pain and hematuria.   Musculoskeletal:  Positive for gait problem. Negative for neck pain and neck stiffness.   Skin: Negative for rash and wound.   Neurological: Positive for speech difficulty and weakness. Negative for seizures and syncope.   Hematological: Negative for adenopathy. Does not bruise/bleed easily.   Psychiatric/Behavioral: Positive for decreased concentration. Negative for behavioral problems and confusion.   All other systems reviewed and are negative.        Objective:      Physical Exam   Constitutional: She is cooperative. She does not appear ill. No distress.   HENT:   Head: Head is without laceration, without right periorbital erythema and without left periorbital erythema.   Nose: No epistaxis.   Mouth/Throat: Oropharynx is clear and moist. No oropharyngeal exudate. No tonsillar exudate.   Eyes: Conjunctivae are normal.   Neck: Neck supple. No thyroid mass and no thyromegaly present.   Cardiovascular: Normal rate and regular rhythm.  Exam reveals no friction rub.    Pulmonary/Chest: Breath sounds normal. No accessory muscle usage or stridor. No tachypnea. No respiratory distress. Chest wall is not dull to percussion. She exhibits no tenderness.   Abdominal: Soft. She exhibits no distension, no ascites and no mass. There is no hepatosplenomegaly.   Musculoskeletal: She exhibits no edema.   Lymphadenopathy:        Head (right side): No submental and no submandibular adenopathy present.        Head (left side): No submental and no submandibular adenopathy present.     She has no cervical adenopathy.     She has no axillary adenopathy.   Neurological: She is alert. She has normal strength. No cranial nerve deficit. Coordination and gait abnormal.   Skin: No bruising, no petechiae and no rash noted. She is not diaphoretic.   Psychiatric: Her behavior is normal. Thought content normal. Cognition and memory are normal. She does not exhibit a depressed mood.   Vitals reviewed.        Assessment:       1. Brain metastasis        Plan:   Ms.  Ayan has multiple brain metastases with a 2.3 cm lesion in the left occipital lobe.  She is symptomatic.  Primary is unknown.  She does not absolutely want a biopsy.  She is actually contemplating whether she should receive radiation therapy.    I discussed further management options with the patient and her family. Suggested she see Radiation Oncology for evaluation and discussion.    They are not interested in further testing or imaging or a PET scan at this time.  She is open to radiation therapy in a palliative manner and possibly whole brain radiation as deemed appropriate by the radiation oncologist.    I will see her back in the clinic for followup in about three months with a repeat brain MRI and a PET scan, pending evaluation by Radiation Oncology.  All questions answered.

## 2018-07-09 ENCOUNTER — INITIAL CONSULT (OUTPATIENT)
Dept: RADIATION ONCOLOGY | Facility: CLINIC | Age: 83
End: 2018-07-09
Payer: MEDICARE

## 2018-07-09 VITALS
DIASTOLIC BLOOD PRESSURE: 66 MMHG | HEIGHT: 65 IN | BODY MASS INDEX: 23.43 KG/M2 | RESPIRATION RATE: 20 BRPM | HEART RATE: 57 BPM | SYSTOLIC BLOOD PRESSURE: 147 MMHG | TEMPERATURE: 98 F | WEIGHT: 140.63 LBS

## 2018-07-09 DIAGNOSIS — C79.31 BRAIN METASTASIS: Primary | ICD-10-CM

## 2018-07-09 PROCEDURE — 99999 PR PBB SHADOW E&M-EST. PATIENT-LVL III: CPT | Mod: PBBFAC,,, | Performed by: RADIOLOGY

## 2018-07-09 PROCEDURE — 3077F SYST BP >= 140 MM HG: CPT | Mod: CPTII,S$GLB,, | Performed by: RADIOLOGY

## 2018-07-09 PROCEDURE — 3078F DIAST BP <80 MM HG: CPT | Mod: CPTII,S$GLB,, | Performed by: RADIOLOGY

## 2018-07-09 PROCEDURE — 99204 OFFICE O/P NEW MOD 45 MIN: CPT | Mod: S$GLB,,, | Performed by: RADIOLOGY

## 2018-07-09 NOTE — PROGRESS NOTES
REFERRING PHYSICIAN: Nazario Menjivar MD    PROBLEM: Mrs. 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     OTHER MEDICAL HISTORY: Patient smokes.  She has had no significant prior surgeries.  She is treated or followed for hypertension.  PS is ECOG 1.Psychosocial Distress screening score of   noted and reviewed. No intervention indicated.    PRESENT ILLNESS: Patient was seen in the ED at [facility] on 6/29/18 complaining of a one-month history of increasing difficulty speaking and ataxia.  MRI scan of the brain that day shows at least 6 enhancing lesions in the brain the appearance of metastasis.  Largest is 24 mm size and an irregularly enhancing.  CT scan of the chest, abdomen and pelvis on 6/29/18 shows a 1.4 cm mass projecting from the lower pole of the right kidney and bilateral adrenal nodules.  MRI scan of the abdomen on 6/30/18 reports the renal mass is a hemorrhagic cyst.  The adrenal nodules have the appearance of adenomas.  There are no evident lung or mediastinal lymph node lesions. She is taking dexamethasone and keppra.     PHYSICAL EXAMINATION: Patient is an alert elderly woman who responds appropriately.  She is seen seated in a wheelchair which she uses in clinic.  She is ambulatory at home.  There is a 1.5 cm dark black pigmented lesion on the vertex scalp with a 5 mm pigmented nodule about 2 cm from it.  There is a third pigmented plaque on the occipital scalp behind the right ear.  The respirations are normal.  There is no cervical or supraclavicular lymphadenopathy.  There is reported a visual field deficit for vision to the right.  There are no other evident neurologic deficits.    RADIOLOGIC STUDIES: These are as noted above.    LABORATORY STUDIES: On 7/1/18 the hemoglobin is 12.8 with a white count of 7710  and a platelet count of 276,000.  Comprehensive metabolic panel is remarkable for a glucose 146.  This may be in response to treatment with corticosteroid.    IMPRESSION: Radiation treatment of the whole brain is recommended.     PLAN:  Patient is to be seen in the dermatology clinic by Dr. Villatoro on 7/11/18 for an opinion about the lesions on the scalp and biopsy. She returns 7/13/18 for radiation treatment planning simulation. A course of radiation to a dose of about 30 Gy in fractions of 3 Gy each is expected to begin in the week of 7/16. (45 minutes in discussion with patient and family).

## 2018-07-09 NOTE — LETTER
July 9, 2018      Nazario Menjivar MD  [address] [place] [address]           [facility] - Radiation Oncology  [address]  [place] [address]  Phone: [phone]          Patient: Alfreda Botello   MR Number: [id number]   YOB: 1935   Date of Visit: 7/9/2018       Dear Dr. Nazario Menjivar:    Thank you for referring Alfreda Botello to me for evaluation. Attached you will find relevant portions of my assessment and plan of care.    If you have questions, please do not hesitate to call me. I look forward to following Alfreda Botello along with you.    Sincerely,    Darnell Melton MD    Enclosure  CC:  No Recipients    If you would like to receive this communication electronically, please contact [email or web address] or [phone] to request more information on [facility] access.    For providers and/or their staff who would like to refer a patient to [facility], please contact us through our one-stop-shop provider referral line, [facility], at [phone].    If you feel you have received this communication in error or would no longer like to receive these types of communications, please e-mail [email or web address]

## 2018-07-10 ENCOUNTER — PATIENT MESSAGE (OUTPATIENT)
Dept: RADIATION ONCOLOGY | Facility: CLINIC | Age: 83
End: 2018-07-10

## 2018-07-11 ENCOUNTER — OFFICE VISIT (OUTPATIENT)
Dept: DERMATOLOGY | Facility: CLINIC | Age: 83
End: 2018-07-11
Payer: MEDICARE

## 2018-07-11 DIAGNOSIS — D48.9 NEOPLASM OF UNCERTAIN BEHAVIOR: Primary | ICD-10-CM

## 2018-07-11 DIAGNOSIS — Z12.83 SCREENING EXAM FOR SKIN CANCER: ICD-10-CM

## 2018-07-11 DIAGNOSIS — L57.0 ACTINIC KERATOSIS: ICD-10-CM

## 2018-07-11 DIAGNOSIS — L82.1 SEBORRHEIC KERATOSES: ICD-10-CM

## 2018-07-11 PROCEDURE — 11101 PR BIOPSY, EACH ADDED LESION: CPT | Mod: S$GLB,,, | Performed by: DERMATOLOGY

## 2018-07-11 PROCEDURE — 88305 TISSUE EXAM BY PATHOLOGIST: CPT | Performed by: PATHOLOGY

## 2018-07-11 PROCEDURE — 99203 OFFICE O/P NEW LOW 30 MIN: CPT | Mod: 25,S$GLB,, | Performed by: DERMATOLOGY

## 2018-07-11 PROCEDURE — 17000 DESTRUCT PREMALG LESION: CPT | Mod: S$GLB,,, | Performed by: DERMATOLOGY

## 2018-07-11 PROCEDURE — 99999 PR PBB SHADOW E&M-EST. PATIENT-LVL III: CPT | Mod: PBBFAC,,, | Performed by: DERMATOLOGY

## 2018-07-11 PROCEDURE — 11100 PR BIOPSY OF SKIN LESION: CPT | Mod: 59,S$GLB,, | Performed by: DERMATOLOGY

## 2018-07-11 NOTE — PROGRESS NOTES
"  Subjective:       Patient ID:  Alfreda Botello is a 82 y.o. female who presents for   Chief Complaint   Patient presents with    Spot     scalp     Spot  - Initial  Affected locations: scalp  Signs / symptoms: asymptomatic  Aggravated by: nothing  Relieving factors/Treatments tried: nothing    81 yo female recently found to have brain metastases, sent by radiation oncology to evaluate if 3 pigmented lesions on scalp could represent the primary malignancy.  The patient is here with her son.  She reports that these areas have "come and gone" over time, flaking off and then regrowing.  No pain, bleeding, itching from any of the sites.  Also has a growth on right elbow she would like removed.  Follows with Dr Carmichael in the past for skin exams.    Review of Systems   Skin: Positive for dry skin.   Hematologic/Lymphatic: Bruises/bleeds easily.        Objective:    Physical Exam   Constitutional: She appears well-developed and well-nourished. No distress.   Neurological: She is alert and oriented to person, place, and time. She is not disoriented.   Psychiatric: She has a normal mood and affect.   Skin:   Areas Examined (abnormalities noted in diagram):   Scalp / Hair Palpated and Inspected  Head / Face Inspection Performed  Neck Inspection Performed  Chest / Axilla Inspection Performed  Abdomen Inspection Performed  Genitals / Buttocks / Groin Inspection Performed  Back Inspection Performed  RUE Inspected  LUE Inspection Performed  RLE Inspected  LLE Inspection Performed  Nails and Digits Inspection Performed                   Diagram Legend     Erythematous scaling macule/papule c/w actinic keratosis       Vascular papule c/w angioma      Pigmented verrucoid papule/plaque c/w seborrheic keratosis      Yellow umbilicated papule c/w sebaceous hyperplasia      Irregularly shaped tan macule c/w lentigo     1-2 mm smooth white papules consistent with Milia      Movable subcutaneous cyst with punctum c/w epidermal " inclusion cyst      Subcutaneous movable cyst c/w pilar cyst      Firm pink to brown papule c/w dermatofibroma      Pedunculated fleshy papule(s) c/w skin tag(s)      Evenly pigmented macule c/w junctional nevus     Mildly variegated pigmented, slightly irregular-bordered macule c/w mildly atypical nevus      Flesh colored to evenly pigmented papule c/w intradermal nevus       Pink pearly papule/plaque c/w basal cell carcinoma      Erythematous hyperkeratotic cursted plaque c/w SCC      Surgical scar with no sign of skin cancer recurrence      Open and closed comedones      Inflammatory papules and pustules      Verrucoid papule consistent consistent with wart     Erythematous eczematous patches and plaques     Dystrophic onycholytic nail with subungual debris c/w onychomycosis     Umbilicated papule    Erythematous-base heme-crusted tan verrucoid plaque consistent with inflamed seborrheic keratosis     Erythematous Silvery Scaling Plaque c/w Psoriasis     See annotation      Vertex scalp from above; anterior is at bottom of photograph          Occipital scalp biopsy site            Anterior vertex scalp biopsy site            Posterior vertex scalp biopsy site    Assessment / Plan:      Pathology Orders:     Normal Orders This Visit    Tissue Specimen To Pathology, Dermatology     Questions:    Directional Terms:  Other(comment)    Clinical information:  2 cm hyperpigmented verrucous nodule SK vs MM    Specific Site:  occipital scalp    Tissue Specimen To Pathology, Dermatology     Questions:    Directional Terms:  Other(comment)    Clinical information:  5 mm waxy tan brown pigmented macule SK vs MM    Specific Site:  vertex posterior scalp    Tissue Specimen To Pathology, Dermatology     Questions:    Directional Terms:  Other(comment)    Clinical information:  verrucous 1.5 cm plaque r/o SK vs MM    Specific Site:  vertex anterior scalp        Neoplasm of uncertain behavior - the 3 pigmented lesions were  biopsied and removed to be checked for melanoma.   I favor benign seborrheic keratoses, but given the dark coloration and known brain metastases I feel biopsy is indicated of all 3 lesions.  -     Tissue Specimen To Pathology, Dermatology  -     Tissue Specimen To Pathology, Dermatology  -     Tissue Specimen To Pathology, Dermatology  Shave biopsy procedure note:  Shave biopsy performed after verbal consent including risk of infection, scar, recurrence, need for additional treatment of site. Area prepped with alcohol, anesthetized with approximately 1.0cc of 1% lidocaine with epinephrine. Lesional tissue shaved with razor blade. Hemostasis achieved with application of aluminum chloride. No complications. Dressing applied. Wound care explained.    Screening exam for skin cancer - no other areas concerning for a primary melanoma on total body exam today.  Total body skin examination performed today including at least 12 points as noted in physical examination. Suspicious lesions noted.    Actinic keratosis - hypertrophic right elbow/upper arm treated today. Patient declines cryo to AK on mid nose today.  Cryosurgery Procedure Note    Verbal consent from the patient is obtained including, but not limited to, risk of hypopigmentation/hyperpigmentation, scar, recurrence of lesion. The patient is aware of the precancerous quality and need for treatment of these lesions. Liquid nitrogen cryosurgery is applied to the 1 actinic keratoses, as detailed in the physical exam, to produce a freeze injury. The patient is aware that blisters may form and is instructed on wound care with gentle cleansing and use of vaseline ointment to keep moist until healed. The patient is supplied a handout on cryosurgery and is instructed to call if lesions do not completely resolve.    Seborrheic keratoses - left umbilicus  These are benign inherited growths without a malignant potential. Reassurance given to patient. No treatment is necessary.             Follow-up if symptoms worsen or fail to improve.

## 2018-07-11 NOTE — Clinical Note
Biopsied the 3 pigmented lesions of concern - I favor they are benign but agree they are dark - did total body exam and no other areas concerning for a primary melanoma - will let you know results once they are back. Thanks, Eli

## 2018-07-11 NOTE — LETTER
July 11, 2018      Darnell Melton MD  [address]  [facility] [address]           [facility] - Dermatology  [address]  [facility] [address]  Phone: [phone]  Fax: [phone]          Patient: Alfreda Botello   MR Number: [id number]   YOB: 1935   Date of Visit: 7/11/2018       Dear Dr. Darnell Melton:    Thank you for referring Alfreda Botello to me for evaluation. Attached you will find relevant portions of my assessment and plan of care.    If you have questions, please do not hesitate to call me. I look forward to following Alfreda Botello along with you.    Sincerely,    Eli Villatoro MD    Enclosure  CC:  No Recipients    If you would like to receive this communication electronically, please contact [email or web address] or [phone] to request more information on [facility] [facility] access.    For providers and/or their staff who would like to refer a patient to [facility], please contact us through our one-stop-shop provider referral line, [facility], at [phone].    If you feel you have received this communication in error or would no longer like to receive these types of communications, please e-mail [email or web address]

## 2018-07-12 ENCOUNTER — TELEPHONE (OUTPATIENT)
Dept: ADMINISTRATIVE | Facility: CLINIC | Age: 83
End: 2018-07-12

## 2018-07-12 ENCOUNTER — PATIENT MESSAGE (OUTPATIENT)
Dept: RADIATION ONCOLOGY | Facility: CLINIC | Age: 83
End: 2018-07-12

## 2018-07-12 NOTE — PT/OT/SLP DISCHARGE
Physical Therapy Discharge Summary    Name: Alfreda Botello  MRN: [id number]   Principal Problem: Brain metastasis     Patient Discharged from acute Physical Therapy on 18.  Please refer to prior PT noted date on 18 for functional status.     Assessment:     Patient appropriate for care in another setting.    Objective:     GOALS:    Physical Therapy Goals        Problem: Physical Therapy Goal    Goal Priority Disciplines Outcome Goal Variances Interventions   Physical Therapy Goal     PT/OT, PT Ongoing (interventions implemented as appropriate)     Description:  Goals to be met by: 7/10/18     Patient will increase functional independence with mobility by performin. Sit to stand transfer with Supervision  2. Bed to chair transfer with Supervision using Rolling Walker  3. Gait  x 140 feet with Supervision using Rolling Walker.                       Reasons for Discontinuation of Therapy Services  Transfer to alternate level of care.      Plan:     Patient Discharged to: Home with Home Health Service.    Deni Burgos III, PT  2018

## 2018-07-13 ENCOUNTER — PATIENT MESSAGE (OUTPATIENT)
Dept: RADIATION ONCOLOGY | Facility: CLINIC | Age: 83
End: 2018-07-13

## 2018-07-13 ENCOUNTER — TELEPHONE (OUTPATIENT)
Dept: RADIATION ONCOLOGY | Facility: CLINIC | Age: 83
End: 2018-07-13

## 2018-07-13 ENCOUNTER — TELEPHONE (OUTPATIENT)
Dept: DERMATOLOGY | Facility: CLINIC | Age: 83
End: 2018-07-13

## 2018-07-13 DIAGNOSIS — C71.8 MALIGNANT NEOPLASM OF OVERLAPPING SITES OF BRAIN: ICD-10-CM

## 2018-07-13 RX ORDER — DEXAMETHASONE 4 MG/1
4 TABLET ORAL 2 TIMES DAILY WITH MEALS
Qty: 60 TABLET | Refills: 0 | Status: SHIPPED | OUTPATIENT
Start: 2018-07-13 | End: 2018-08-12

## 2018-07-13 RX ORDER — LEVETIRACETAM 500 MG/1
500 TABLET ORAL 2 TIMES DAILY
Qty: 60 TABLET | Refills: 11 | Status: SHIPPED | OUTPATIENT
Start: 2018-07-13 | End: 2018-07-26 | Stop reason: SDUPTHER

## 2018-07-13 NOTE — TELEPHONE ENCOUNTER
Returned call to pt nurse. I left a message regarding on how the three biopsy sites should be taken care of. Let her know to wash at least once a day with soapy water and to apply vaseline or aquaphor with band aid twice a day until spots are healed. ----- Message from Emilee Esteban sent at 7/13/2018 10:20 AM CDT -----  Contact: Home health nurse Laila at [phone]  Cool pt-Pt had biopsy done yesterday and is calling for orders for care of the 3 sites.  Will need orders for all sites.

## 2018-07-17 ENCOUNTER — TELEPHONE (OUTPATIENT)
Dept: DERMATOLOGY | Facility: CLINIC | Age: 83
End: 2018-07-17

## 2018-07-17 NOTE — TELEPHONE ENCOUNTER
Spoke with Rachael Miller health nurse. Advised her to tell pt to clean area with peroxide. Wash twice daily with soapy water and apply fresh vaseline with band aid. ----- Message from Maddy Ramos sent at 7/17/2018  1:34 PM CDT -----  Contact: [facility] care  [place]-[phone]-[facility] care nurse-please call her at number in message concerned about biopsy area not looking well need to speak with the nurse ASAP waiting on a call back today

## 2018-07-18 ENCOUNTER — PATIENT MESSAGE (OUTPATIENT)
Dept: RADIATION ONCOLOGY | Facility: CLINIC | Age: 83
End: 2018-07-18

## 2018-07-20 ENCOUNTER — PATIENT MESSAGE (OUTPATIENT)
Dept: RADIATION ONCOLOGY | Facility: CLINIC | Age: 83
End: 2018-07-20

## 2018-07-20 ENCOUNTER — PATIENT MESSAGE (OUTPATIENT)
Dept: DERMATOLOGY | Facility: CLINIC | Age: 83
End: 2018-07-20

## 2018-07-24 ENCOUNTER — TELEPHONE (OUTPATIENT)
Dept: RADIATION ONCOLOGY | Facility: CLINIC | Age: 83
End: 2018-07-24

## 2018-07-24 ENCOUNTER — DOCUMENTATION ONLY (OUTPATIENT)
Dept: RADIATION ONCOLOGY | Facility: CLINIC | Age: 83
End: 2018-07-24

## 2018-07-24 NOTE — PROGRESS NOTES
Spoke to the patient's daughter, Kimmie () about hospice services on request from Sandra Alberto RN in Radiation Oncology. Explained hospice services in- and outpatient to her and answered all her questions. She would like to discuss hospice services with the patient and she agreed to call back with a request after their discussion. Notified Sandra Alberto RN in Radiation Oncology and will follow for referral at time of call back from the patient's daughter.

## 2018-07-24 NOTE — TELEPHONE ENCOUNTER
----- Message from Angeles Carter sent at 7/24/2018  1:33 PM CDT -----  Contact: Pt's daughter Kimmie  Ms Kimmie would like to speak to you about her mother's care. Please call at [phone].  Return call to Merary discussed Hospice care will have  call her to discuss Hospice further

## 2018-07-26 ENCOUNTER — DOCUMENTATION ONLY (OUTPATIENT)
Dept: RADIATION ONCOLOGY | Facility: CLINIC | Age: 83
End: 2018-07-26

## 2018-07-26 ENCOUNTER — DOCUMENTATION ONLY (OUTPATIENT)
Dept: RADIATION ONCOLOGY | Facility: HOSPITAL | Age: 83
End: 2018-07-26

## 2018-07-26 DIAGNOSIS — C71.8 MALIGNANT NEOPLASM OF OVERLAPPING SITES OF BRAIN: Primary | ICD-10-CM

## 2018-07-26 RX ORDER — LEVETIRACETAM 500 MG/1
500 TABLET ORAL 2 TIMES DAILY
Qty: 60 TABLET | Refills: 11 | Status: SHIPPED | OUTPATIENT
Start: 2018-07-26 | End: 2019-07-26

## 2018-07-26 NOTE — PROGRESS NOTES
The patient's daughter, Kimmie called in follow up of discussion about hospice services on 7/24/18. They decided to have hospice services through [facility]. Informed Sandra Alberto RN in Radiation Oncology and Dr Melton provided an order for hospice services. Referral made to Ana at [facility] [phone] and order and documentation faxed to her at . The patient's daughter has contact information for me.

## 2018-07-27 ENCOUNTER — TELEPHONE (OUTPATIENT)
Dept: RADIATION ONCOLOGY | Facility: CLINIC | Age: 83
End: 2018-07-27